# Patient Record
Sex: FEMALE | Race: WHITE | Employment: OTHER | URBAN - METROPOLITAN AREA
[De-identification: names, ages, dates, MRNs, and addresses within clinical notes are randomized per-mention and may not be internally consistent; named-entity substitution may affect disease eponyms.]

---

## 2017-01-03 ENCOUNTER — TRANSFERRED RECORDS (OUTPATIENT)
Dept: HEALTH INFORMATION MANAGEMENT | Facility: CLINIC | Age: 77
End: 2017-01-03

## 2017-01-03 DIAGNOSIS — R41.89 COGNITIVE IMPAIRMENT: Primary | ICD-10-CM

## 2017-01-03 DIAGNOSIS — R41.3 MEMORY LOSS: ICD-10-CM

## 2017-01-04 RX ORDER — DONEPEZIL HYDROCHLORIDE 10 MG/1
TABLET, FILM COATED ORAL
Qty: 90 TABLET | Refills: 2 | Status: SHIPPED | OUTPATIENT
Start: 2017-01-04 | End: 2017-11-14

## 2017-01-04 NOTE — TELEPHONE ENCOUNTER
Received refill request for patients aricept. Plan at visit 9/2016 was to continue this med. Refill sent.

## 2017-06-26 ENCOUNTER — TRANSFERRED RECORDS (OUTPATIENT)
Dept: HEALTH INFORMATION MANAGEMENT | Facility: CLINIC | Age: 77
End: 2017-06-26

## 2017-07-10 ENCOUNTER — TRANSFERRED RECORDS (OUTPATIENT)
Dept: HEALTH INFORMATION MANAGEMENT | Facility: CLINIC | Age: 77
End: 2017-07-10

## 2017-07-12 ENCOUNTER — TRANSFERRED RECORDS (OUTPATIENT)
Dept: HEALTH INFORMATION MANAGEMENT | Facility: CLINIC | Age: 77
End: 2017-07-12

## 2017-07-17 ENCOUNTER — TRANSFERRED RECORDS (OUTPATIENT)
Dept: HEALTH INFORMATION MANAGEMENT | Facility: CLINIC | Age: 77
End: 2017-07-17

## 2017-09-11 NOTE — TELEPHONE ENCOUNTER
Received refill request for Donepezil.  Last in clinic 11/2016 for pre-op appt. No annual exam in 2016. Annual exam needed, did not refill pending notification to patient.    Attempted to reach pt via phone but no answer and no voicemail available.

## 2017-09-12 NOTE — TELEPHONE ENCOUNTER
Received refill request for Donepezil.  Last in clinic 11/2016 for pre-op appt. No annual exam in 2016. Annual exam needed, did not refill pending notification to patient.    Second attempt this week to reach pt via phone but no answer and no voicemail available.

## 2017-09-19 NOTE — TELEPHONE ENCOUNTER
Received call from [address], stating he works with Pol[facility] and they received a call yesterday and need to schedule an appointment.  Forwarded him to  to schedule.      NOTE: He stated they are receiving ALL medications from [place] now as they moved there. Confirmed they DO NOT need a refill of medication from Dr. Lee and this was confirmed.

## 2017-10-11 ENCOUNTER — RADIANT APPOINTMENT (OUTPATIENT)
Dept: MAMMOGRAPHY | Facility: CLINIC | Age: 77
End: 2017-10-11
Attending: FAMILY MEDICINE
Payer: MEDICARE

## 2017-10-11 ENCOUNTER — OFFICE VISIT (OUTPATIENT)
Dept: FAMILY MEDICINE | Facility: CLINIC | Age: 77
End: 2017-10-11
Attending: FAMILY MEDICINE
Payer: MEDICARE

## 2017-10-11 VITALS
WEIGHT: 175.1 LBS | DIASTOLIC BLOOD PRESSURE: 75 MMHG | SYSTOLIC BLOOD PRESSURE: 121 MMHG | HEIGHT: 62 IN | BODY MASS INDEX: 32.22 KG/M2 | HEART RATE: 53 BPM

## 2017-10-11 DIAGNOSIS — R41.3 MEMORY LOSS: ICD-10-CM

## 2017-10-11 DIAGNOSIS — E55.9 VITAMIN D DEFICIENCY: ICD-10-CM

## 2017-10-11 DIAGNOSIS — Z13.220 SCREENING FOR CHOLESTEROL LEVEL: ICD-10-CM

## 2017-10-11 DIAGNOSIS — Z13.1 SCREENING FOR DIABETES MELLITUS: ICD-10-CM

## 2017-10-11 DIAGNOSIS — J45.30 MILD PERSISTENT ASTHMA WITHOUT COMPLICATION: ICD-10-CM

## 2017-10-11 DIAGNOSIS — Z00.00 ANNUAL PHYSICAL EXAM: Primary | ICD-10-CM

## 2017-10-11 DIAGNOSIS — E03.9 ACQUIRED HYPOTHYROIDISM: ICD-10-CM

## 2017-10-11 DIAGNOSIS — Z12.31 VISIT FOR SCREENING MAMMOGRAM: ICD-10-CM

## 2017-10-11 PROBLEM — J45.909 ASTHMA: Status: ACTIVE | Noted: 2017-10-11

## 2017-10-11 PROBLEM — Z88.9 MULTIPLE ALLERGIES: Status: ACTIVE | Noted: 2017-10-11

## 2017-10-11 PROBLEM — K21.9 GERD (GASTROESOPHAGEAL REFLUX DISEASE): Status: ACTIVE | Noted: 2017-10-11

## 2017-10-11 PROBLEM — E78.00 HIGH CHOLESTEROL: Status: ACTIVE | Noted: 2017-10-11

## 2017-10-11 LAB
ANION GAP SERPL CALCULATED.3IONS-SCNC: 8 MMOL/L (ref 3–14)
BUN SERPL-MCNC: 20 MG/DL (ref 7–30)
CALCIUM SERPL-MCNC: 8.7 MG/DL (ref 8.5–10.1)
CHLORIDE SERPL-SCNC: 109 MMOL/L (ref 94–109)
CHOLEST SERPL-MCNC: 226 MG/DL
CO2 SERPL-SCNC: 26 MMOL/L (ref 20–32)
CREAT SERPL-MCNC: 0.95 MG/DL (ref 0.52–1.04)
DEPRECATED CALCIDIOL+CALCIFEROL SERPL-MC: 43 UG/L (ref 20–75)
ERYTHROCYTE [DISTWIDTH] IN BLOOD BY AUTOMATED COUNT: 13.6 % (ref 10–15)
GFR SERPL CREATININE-BSD FRML MDRD: 57 ML/MIN/1.7M2
GLUCOSE SERPL-MCNC: 88 MG/DL (ref 70–99)
HCT VFR BLD AUTO: 40.7 % (ref 35–47)
HDLC SERPL-MCNC: 75 MG/DL
HGB BLD-MCNC: 13.5 G/DL (ref 11.7–15.7)
LDLC SERPL CALC-MCNC: 133 MG/DL
MCH RBC QN AUTO: 30.7 PG (ref 26.5–33)
MCHC RBC AUTO-ENTMCNC: 33.2 G/DL (ref 31.5–36.5)
MCV RBC AUTO: 93 FL (ref 78–100)
NONHDLC SERPL-MCNC: 151 MG/DL
PLATELET # BLD AUTO: 204 10E9/L (ref 150–450)
POTASSIUM SERPL-SCNC: 4.4 MMOL/L (ref 3.4–5.3)
RBC # BLD AUTO: 4.4 10E12/L (ref 3.8–5.2)
SODIUM SERPL-SCNC: 143 MMOL/L (ref 133–144)
TRIGL SERPL-MCNC: 91 MG/DL
TSH SERPL DL<=0.005 MIU/L-ACNC: 2.5 MU/L (ref 0.4–4)
WBC # BLD AUTO: 4.4 10E9/L (ref 4–11)

## 2017-10-11 PROCEDURE — 36415 COLL VENOUS BLD VENIPUNCTURE: CPT | Performed by: FAMILY MEDICINE

## 2017-10-11 PROCEDURE — 82306 VITAMIN D 25 HYDROXY: CPT | Performed by: FAMILY MEDICINE

## 2017-10-11 PROCEDURE — 80048 BASIC METABOLIC PNL TOTAL CA: CPT | Performed by: FAMILY MEDICINE

## 2017-10-11 PROCEDURE — 84443 ASSAY THYROID STIM HORMONE: CPT | Performed by: FAMILY MEDICINE

## 2017-10-11 PROCEDURE — 80061 LIPID PANEL: CPT | Performed by: FAMILY MEDICINE

## 2017-10-11 PROCEDURE — G0202 SCR MAMMO BI INCL CAD: HCPCS

## 2017-10-11 PROCEDURE — 85027 COMPLETE CBC AUTOMATED: CPT | Performed by: FAMILY MEDICINE

## 2017-10-11 PROCEDURE — 99213 OFFICE O/P EST LOW 20 MIN: CPT | Mod: 25,ZF

## 2017-10-11 ASSESSMENT — PAIN SCALES - GENERAL: PAINLEVEL: NO PAIN (0)

## 2017-10-11 NOTE — MR AVS SNAPSHOT
After Visit Summary   10/11/2017    Polly Gonzalez    MRN: [id number]           Patient Information     Date Of Birth          1940        Visit Information        Provider Department      10/11/2017 8:40 AM Ghazal Lee MD [facility]        Today's Diagnoses     Annual physical exam    -  1    Acquired hypothyroidism        Mild persistent asthma without complication        Memory loss        Screening for cholesterol level        Vitamin D deficiency        Screening for diabetes mellitus           Follow-ups after your visit        Who to contact     Please call your clinic at [phone] to:    Ask questions about your health    Make or cancel appointments    Discuss your medicines    Learn about your test results    Speak to your doctor   If you have compliments or concerns about an experience at your clinic, or if you wish to file a complaint, please contact [facility] Patient Relations at [phone] or email us at [email or web address]         Additional Information About Your Visit        MyChart Information     [employer] gives you secure access to your electronic health record. If you see a primary care provider, you can also send messages to your care team and make appointments. If you have questions, please call your primary care clinic.  If you do not have a primary care provider, please call [phone] and they will assist you.      Apture is an electronic gateway that provides easy, online access to your medical records. With Apture, you can request a clinic appointment, read your test results, renew a prescription or communicate with your care team.     To access your existing account, please contact your [facility] or call [phone] for assistance.        Care EveryWhere ID     This is your Care EveryWhere ID. This could be used by other organizations to access your [place]  "medical records  [id number]        Your Vitals Were     Pulse Height BMI (Body Mass Index)             53 1.575 m (5' 2\") 32.03 kg/m2          Blood Pressure from Last 3 Encounters:   10/11/17 121/75   11/08/16 120/75   09/06/16 106/64    Weight from Last 3 Encounters:   10/11/17 79.4 kg (175 lb 1.6 oz)   11/08/16 74.4 kg (164 lb)   09/06/16 70.9 kg (156 lb 6.4 oz)                 Today's Medication Changes          These changes are accurate as of: 10/11/17 11:03 AM.  If you have any questions, ask your nurse or doctor.               Stop taking these medicines if you haven't already. Please contact your care team if you have questions.     cholecalciferol 5000 UNITS Tabs tablet   Commonly known as:  vitamin D3   Stopped by:  Ghazal Lee MD                    Primary Care Provider Office Phone # Fax #    Ghazal Lee [phone]       [address] [place] [address]        Equal Access to Services     [facility]: Hadii steven ceballos Sojustino, waaxda lucarlos, qaybta kaalmapablo ferraro, kishore zuleta . So [facility] [phone].    ATENCIÓN: Si habla español, tiene a cat disposición servicios gratuitos de asistencia lingüística. Kwasi al [phone].    We comply with applicable federal civil rights laws and [place] laws. We do not discriminate on the basis of race, color, national origin, age, disability, sex, sexual orientation, or gender identity.            Thank you!     Thank you for choosing [facility]  for your care. Our goal is always to provide you with excellent care. Hearing back from our patients is one way we can continue to improve our services. Please take a few minutes to complete the written survey that you may receive in the mail after your visit with us. Thank you!             Your Updated Medication List - Protect others around you: Learn how to safely use, store and throw away your medicines at " www.disposemymeds.org.          This list is accurate as of: 10/11/17 11:03 AM.  Always use your most recent med list.                   Brand Name Dispense Instructions for use Diagnosis    * albuterol 108 (90 BASE) MCG/ACT Inhaler    PROAIR HFA/PROVENTIL HFA/VENTOLIN HFA    1 Inhaler    Inhale 2 puffs into the lungs every 6 hours as needed for shortness of breath / dyspnea or wheezing    Mild persistent asthma, uncomplicated       * albuterol (2.5 MG/3ML) 0.083% neb solution      Inhale 2.5 mg into the lungs        aspirin 81 MG tablet      Take 1 tablet by mouth daily.        CALCIUM-VITAMIN D PO      Vitamin B complex        donepezil 10 MG tablet    ARICEPT    90 tablet    TAKE ONE TABLET BY MOUTH AT BEDTIME.    Memory loss       fluticasone-salmeterol 250-50 MCG/DOSE diskus inhaler    ADVAIR    3 Inhaler    Inhale 1 puff into the lungs 2 times daily    Mild persistent asthma, uncomplicated       levothyroxine 100 MCG tablet    SYNTHROID/LEVOTHROID    90 tablet    Take 1 tablet (100 mcg) by mouth daily    Other specified hypothyroidism       lidocaine 2 % topical gel    XYLOCAINE     Apply topically as needed for moderate pain        psyllium Packet    METAMUCIL/KONSYL     Take 1 packet by mouth daily        * Notice:  This list has 2 medication(s) that are the same as other medications prescribed for you. Read the directions carefully, and ask your doctor or other care provider to review them with you.

## 2017-10-11 NOTE — LETTER
10/11/2017       RE: Polly Gonzalez  [id number]  [facility] [address]     Dear Colleague,    Thank you for referring your patient, Polly Gonzalez, to the [facility] at [facility]. Please see a copy of my visit note below.    SUBJECTIVE:   Polly Gonzalez is a 77 year old female who presents for Preventive Visit.  Healthy Habits:    Do you get at least three servings of calcium containing foods daily (dairy, green leafy vegetables, etc.)? yes    Amount of exercise or daily activities, outside of work: nothing regular     Problems taking medications regularly Yes  - Az helps    Medication side effects: No    Have you had an eye exam in the past two years? yes    Do you see a dentist twice per year? yes    Do you have sleep apnea, excessive snoring or daytime drowsiness?no  Concerns:  Sold home in [place] and purchased a home [place] of [place] [[place] acres and a house [single floor]]. Angelika went through and took off the roof so now in repair. Plan to move in November 15, 2017.  Staying at different places [cheap hotel in [place], [place]].     Support - ADRC: Elder Benefit speicialist: [facility]: [phone].  - Dementia on Aricept: follows with neurologist. Is not alone in the house. [long term care insurance].  Looking for more support.   - Hypothyroidism on levothyroxine 100 mcg  - Neck pain persists: Did see Dr. Newton [pain clinic in [place]] and discussed surgery.  Declined at this time. Was seeing a physical therapist.  Past Medical History:   Diagnosis Date     Asthma      GERD (gastroesophageal reflux disease)      HX: breast cancer      Hyperlipidemia LDL goal < 130      Hypothyroidism      Mild dementia      Mild major depression (H)      Past Surgical History:   Procedure Laterality Date     BLEPHAROPLASTY  09     BUNIONECTOMY DANICA BILATERAL  1993     face life  09     HERNIA REPAIR, INCISIONAL  1998    ?   LLQ     MASTECTOMY, FLAP  TRAM PEDICLE, COMBINED  1992     ROTATOR CUFF REPAIR RT/LT  1990/2001     Social History   Substance Use Topics     Smoking status: Never Smoker     Smokeless tobacco: Never Used     Alcohol use 0.0 oz/week     0 Standard drinks or equivalent per week      Comment: rare     The patient does not drink >3 drinks per day nor >7 drinks per week.  Today's PHQ-2 Score:   PHQ-2 ( 1999 Pfizer) 9/6/2016 1/7/2016   Q1: Little interest or pleasure in doing things - -   Q2: Feeling down, depressed or hopeless - -   PHQ-2 Score - -   Q1: Little interest or pleasure in doing things Several days Several days   Q2: Feeling down, depressed or hopeless Several days Several days   PHQ-2 Score 2 2   Do you feel safe in your environment - Yes  Do you have a Health Care Directive?: No: Advance care planning reviewed with patient; information given to patient to review. POLST  Current providers sharing in care for this patient include: Patient Care Team:  Ghazal Lee MD as PCP - General  Fidelia Wharton  Care Manager  Keith Ferraro MD as MD (Internal Medicine)  Yanick Cat MD as Resident  HunterSabino abad MD as MD (Allergy & Immunology)  Alejandro Connolly MD as MD (Ophthalmology)    Hearing impairment: No    Ability to successfully perform activities of daily living: needs to be watched closely.  Fall risk:  Home safety: no throw rugs in the hallway. Az states he does not leave her alone.   The following health maintenance items are reviewed in Epic and correct as of today:Health Maintenance   Topic Date Due     DANIEL QUESTIONNAIRE 1 YEAR  07/25/1958     DEPRESSION ACTION PLAN Q1 YR  07/25/1957     ADVANCE DIRECTIVE PLANNING Q5 YRS  07/25/1994     FALL RISK ASSESSMENT  07/25/2004     PNEUMOCOCCAL (1 of 2 - PCV13) 07/25/2004     ASTHMA CONTROL TEST Q6 MOS  03/23/2015     ASTHMA ACTION PLAN Q1 YR  09/04/2015     PHQ-9 Q3 MONTHS  09/30/2015     INFLUENZA VACCINE (SYSTEM ASSIGNED)  09/01/2017     LIPID  "SCREEN Q5 YR FEMALE (SYSTEM ASSIGNED)  08/19/2019     TETANUS IMMUNIZATION (SYSTEM ASSIGNED)  03/16/2020     DEXA SCAN SCREENING (SYSTEM ASSIGNED)  Completed   ROS:  C: NEGATIVE for fever, chills, change in weight  E/M: NEGATIVE for ear, mouth and throat problems  R: NEGATIVE for significant cough or SOB  CV: NEGATIVE for chest pain, palpitations or peripheral edema  OBJECTIVE:   /75  Pulse 53  Ht 1.575 m (5' 2\")  Wt 79.4 kg (175 lb 1.6 oz)  BMI 32.03 kg/m2 Estimated body mass index is 30 kg/(m^2) as calculated from the following:    Height as of 11/8/16: 1.575 m (5' 2\").    Weight as of 11/8/16: 74.4 kg (164 lb).  EXAM:   Well dressed, well appearing woman in NAD  Word finding difficulty and slow in responses.  Ranjan does most of the speaking  Present with you partner, RANJAN.   Psychological: appropriate mood.Quiet.  Slow recall on speech  Ears, Nose and Throat: tympanic membranes clear, nose clear and free of lesions, throat clear, moist mucous membrames, neck supple with full range of motion.    Neck: No thyroidmegaly. No jugular venous distension, no carotid bruits.  Cardiovascular: regular rate and rhythm, normal S1 and S2, no murmurs, rubs or gallops, peripheral pulses full and symmetric   Gastrointestinal: positive bowel sounds, nontender, no hepatosplenomegaly, no masses. No guarding or rebound.  Musculoskeletal: full range of motion    Skin: no concerning lesions, no jaundice.  Neurological: normal gait, no tremor.   ASSESSMENT / PLAN:   Diagnoses and associated orders for this visit:  Annual physical exam      - Advised finding a MD in [place] or [place]       - Mammogram today      - Labs today     Acquired hypothyroidism      -  Synthroid 100 mg   Mild persistent asthma without complication      -  Advair and Albuterol  Memory loss       - Neurologist on November 14, [place]       - Continue with Aricept  End of Life Planning:  Patient currently has an advanced directive: POLST   Estimated body " "mass index is 30 kg/(m^2) as calculated from the following:    Height as of 11/8/16: 1.575 m (5' 2\").    Weight as of 11/8/16: 74.4 kg (164 lb).     reports that she has never smoked. She has never used smokeless tobacco.  Appropriate preventive services were discussed with this patient, including applicable screening as appropriate for cardiovascular disease, diabetes, osteopenia/osteoporosis, and glaucoma.  As appropriate for age/gender, discussed screening for colorectal cancer, prostate cancer, breast cancer, and cervical cancer. Checklist reviewing preventive services available has been given to the patient.  Reviewed patients plan of care and provided an AVS. The Basic Care Plan (routine screening as documented in Health Maintenance) for Polly meets the Care Plan requirement. This Care Plan has been established and reviewed with the Patient.  Counseling Resources:  ATP IV Guidelines  Pooled Cohorts Equation Calculator  Breast Cancer Risk Calculator  FRAX Risk Assessment  ICSI Preventive Guidelines  Dietary Guidelines for Americans, 2010  USDA's MyPlate  ASA Prophylaxis  Lung CA Screening      Again, thank you for allowing me to participate in the care of your patient.      Sincerely,    Ghazal Lee MD      "

## 2017-10-11 NOTE — PROGRESS NOTES
SUBJECTIVE:   Polly Gonzalez is a 77 year old female who presents for Preventive Visit.  Healthy Habits:    Do you get at least three servings of calcium containing foods daily (dairy, green leafy vegetables, etc.)? yes    Amount of exercise or daily activities, outside of work: nothing regular     Problems taking medications regularly Yes  - Az helps    Medication side effects: No    Have you had an eye exam in the past two years? yes    Do you see a dentist twice per year? yes    Do you have sleep apnea, excessive snoring or daytime drowsiness?no  Concerns:  Sold home in [place] and purchased a home [place] of [place] [31 acres and a house [single floor]]. Angelika went through and took off the roof so now in repair. Plan to move in November 15, 2017.  Staying at different places [[employer] hotel in [place], [place]].     Support - ADRC: Elder Benefit speicialist: Anay Tiffaniemauricio: [phone].  - Dementia on Aricept: follows with neurologist. Is not alone in the house. [long term care insurance].  Looking for more support.   - Hypothyroidism on levothyroxine 100 mcg  - Neck pain persists: Did see Dr. Newton [pain clinic in [place]] and discussed surgery.  Declined at this time. Was seeing a physical therapist.  Past Medical History:   Diagnosis Date     Asthma      GERD (gastroesophageal reflux disease)      HX: breast cancer      Hyperlipidemia LDL goal < 130      Hypothyroidism      Mild dementia      Mild major depression (H)      Past Surgical History:   Procedure Laterality Date     BLEPHAROPLASTY  09     BUNIONECTOMY DANICA BILATERAL  1993     face life  09     HERNIA REPAIR, INCISIONAL  1998    ?   LLQ     MASTECTOMY, FLAP TRAM PEDICLE, COMBINED  1992     ROTATOR CUFF REPAIR RT/LT  1990/2001     Social History   Substance Use Topics     Smoking status: Never Smoker     Smokeless tobacco: Never Used     Alcohol use 0.0 oz/week     0 Standard drinks or equivalent per week      Comment: rare     The patient does  not drink >3 drinks per day nor >7 drinks per week.  Today's PHQ-2 Score:   PHQ-2 ( 1999 Pfizer) 9/6/2016 1/7/2016   Q1: Little interest or pleasure in doing things - -   Q2: Feeling down, depressed or hopeless - -   PHQ-2 Score - -   Q1: Little interest or pleasure in doing things Several days Several days   Q2: Feeling down, depressed or hopeless Several days Several days   PHQ-2 Score 2 2   Do you feel safe in your environment - Yes  Do you have a Health Care Directive?: No: Advance care planning reviewed with patient; information given to patient to review. POLST  Current providers sharing in care for this patient include: Patient Care Team:  Ghazal Lee MD as PCP - General  Fidelia Wharton  Care Manager  Keith Ferraro MD as MD (Internal Medicine)  Yanick Cat MD as Resident  Sabino Hunter MD as MD (Allergy & Immunology)  Alejandro Connolly MD as MD (Ophthalmology)    Hearing impairment: No    Ability to successfully perform activities of daily living: needs to be watched closely.  Fall risk:  Home safety: no throw rugs in the hallway. Az states he does not leave her alone.   The following health maintenance items are reviewed in Epic and correct as of today:Health Maintenance   Topic Date Due     DANIEL QUESTIONNAIRE 1 YEAR  07/25/1958     DEPRESSION ACTION PLAN Q1 YR  07/25/1957     ADVANCE DIRECTIVE PLANNING Q5 YRS  07/25/1994     FALL RISK ASSESSMENT  07/25/2004     PNEUMOCOCCAL (1 of 2 - PCV13) 07/25/2004     ASTHMA CONTROL TEST Q6 MOS  03/23/2015     ASTHMA ACTION PLAN Q1 YR  09/04/2015     PHQ-9 Q3 MONTHS  09/30/2015     INFLUENZA VACCINE (SYSTEM ASSIGNED)  09/01/2017     LIPID SCREEN Q5 YR FEMALE (SYSTEM ASSIGNED)  08/19/2019     TETANUS IMMUNIZATION (SYSTEM ASSIGNED)  03/16/2020     DEXA SCAN SCREENING (SYSTEM ASSIGNED)  Completed   ROS:  C: NEGATIVE for fever, chills, change in weight  E/M: NEGATIVE for ear, mouth and throat problems  R: NEGATIVE for significant  "cough or SOB  CV: NEGATIVE for chest pain, palpitations or peripheral edema  OBJECTIVE:   /75  Pulse 53  Ht 1.575 m (5' 2\")  Wt 79.4 kg (175 lb 1.6 oz)  BMI 32.03 kg/m2 Estimated body mass index is 30 kg/(m^2) as calculated from the following:    Height as of 11/8/16: 1.575 m (5' 2\").    Weight as of 11/8/16: 74.4 kg (164 lb).  EXAM:   Well dressed, well appearing woman in NAD  Word finding difficulty and slow in responses.  Ranjan does most of the speaking  Present with you partner, RANJAN.   Psychological: appropriate mood.Quiet.  Slow recall on speech  Ears, Nose and Throat: tympanic membranes clear, nose clear and free of lesions, throat clear, moist mucous membrames, neck supple with full range of motion.    Neck: No thyroidmegaly. No jugular venous distension, no carotid bruits.  Cardiovascular: regular rate and rhythm, normal S1 and S2, no murmurs, rubs or gallops, peripheral pulses full and symmetric   Gastrointestinal: positive bowel sounds, nontender, no hepatosplenomegaly, no masses. No guarding or rebound.  Musculoskeletal: full range of motion    Skin: no concerning lesions, no jaundice.  Neurological: normal gait, no tremor.   ASSESSMENT / PLAN:   Diagnoses and associated orders for this visit:  Annual physical exam      - Advised finding a MD in [place] or [place]       - Mammogram today      - Labs today     Acquired hypothyroidism      -  Synthroid 100 mg   Mild persistent asthma without complication      -  Advair and Albuterol  Memory loss       - Neurologist on November 14, [place]       - Continue with Aricept  End of Life Planning:  Patient currently has an advanced directive: POLST   Estimated body mass index is 30 kg/(m^2) as calculated from the following:    Height as of 11/8/16: 1.575 m (5' 2\").    Weight as of 11/8/16: 74.4 kg (164 lb).     reports that she has never smoked. She has never used smokeless tobacco.  Appropriate preventive services were discussed with this patient, " including applicable screening as appropriate for cardiovascular disease, diabetes, osteopenia/osteoporosis, and glaucoma.  As appropriate for age/gender, discussed screening for colorectal cancer, prostate cancer, breast cancer, and cervical cancer. Checklist reviewing preventive services available has been given to the patient.  Reviewed patients plan of care and provided an AVS. The Basic Care Plan (routine screening as documented in Health Maintenance) for Polly meets the Care Plan requirement. This Care Plan has been established and reviewed with the Patient.  Counseling Resources:  ATP IV Guidelines  Pooled Cohorts Equation Calculator  Breast Cancer Risk Calculator  FRAX Risk Assessment  ICSI Preventive Guidelines  Dietary Guidelines for Americans, 2010  USDA's MyPlate  ASA Prophylaxis  Lung CA Screening    Ghazal Lee MD  [facility]

## 2017-11-14 DIAGNOSIS — R41.3 MEMORY LOSS: ICD-10-CM

## 2017-11-14 RX ORDER — DONEPEZIL HYDROCHLORIDE 10 MG/1
TABLET, FILM COATED ORAL
Qty: 90 TABLET | Refills: 3 | Status: SHIPPED | OUTPATIENT
Start: 2017-11-14 | End: 2018-11-26

## 2017-11-14 NOTE — TELEPHONE ENCOUNTER
Received refill request for donepezil .  Last in clinic 10/2017 where this medication was to be continued.  Will send 12 mo supply.

## 2017-12-05 DIAGNOSIS — J45.30 MILD PERSISTENT ASTHMA, UNCOMPLICATED: ICD-10-CM

## 2017-12-05 DIAGNOSIS — E03.8 OTHER SPECIFIED HYPOTHYROIDISM: ICD-10-CM

## 2017-12-06 RX ORDER — LEVOTHYROXINE SODIUM 100 UG/1
TABLET ORAL
Qty: 90 TABLET | Refills: 3 | Status: SHIPPED | OUTPATIENT
Start: 2017-12-06 | End: 2019-03-05

## 2018-01-10 ENCOUNTER — TRANSFERRED RECORDS (OUTPATIENT)
Dept: HEALTH INFORMATION MANAGEMENT | Facility: CLINIC | Age: 78
End: 2018-01-10

## 2018-11-08 ENCOUNTER — RADIANT APPOINTMENT (OUTPATIENT)
Dept: MAMMOGRAPHY | Facility: CLINIC | Age: 78
End: 2018-11-08
Attending: FAMILY MEDICINE
Payer: MEDICARE

## 2018-11-08 DIAGNOSIS — Z12.31 VISIT FOR SCREENING MAMMOGRAM: ICD-10-CM

## 2018-11-08 PROCEDURE — 77067 SCR MAMMO BI INCL CAD: CPT

## 2018-11-26 DIAGNOSIS — R41.3 MEMORY LOSS: ICD-10-CM

## 2018-11-30 RX ORDER — DONEPEZIL HYDROCHLORIDE 10 MG/1
TABLET, FILM COATED ORAL
Qty: 30 TABLET | Refills: 0 | Status: SHIPPED | OUTPATIENT
Start: 2018-11-30 | End: 2019-03-29

## 2018-11-30 NOTE — TELEPHONE ENCOUNTER
Received refill request for Aricept.  Last in clinic October 2017.     Patient scheduled for annual December 2018. Short-term supply sent to pharmacy.

## 2018-12-05 ENCOUNTER — OFFICE VISIT (OUTPATIENT)
Dept: FAMILY MEDICINE | Facility: CLINIC | Age: 78
End: 2018-12-05
Attending: FAMILY MEDICINE
Payer: MEDICARE

## 2018-12-05 VITALS
HEIGHT: 62 IN | DIASTOLIC BLOOD PRESSURE: 75 MMHG | WEIGHT: 176 LBS | SYSTOLIC BLOOD PRESSURE: 112 MMHG | HEART RATE: 65 BPM | BODY MASS INDEX: 32.39 KG/M2

## 2018-12-05 DIAGNOSIS — Z13.1 SCREENING FOR DIABETES MELLITUS: ICD-10-CM

## 2018-12-05 DIAGNOSIS — T14.8XXA SKIN EXCORIATION: ICD-10-CM

## 2018-12-05 DIAGNOSIS — Z13.220 SCREENING FOR CHOLESTEROL LEVEL: ICD-10-CM

## 2018-12-05 DIAGNOSIS — F03.91 DEMENTIA WITH BEHAVIORAL DISTURBANCE, UNSPECIFIED DEMENTIA TYPE: ICD-10-CM

## 2018-12-05 DIAGNOSIS — Z13.0 SCREENING FOR DEFICIENCY ANEMIA: ICD-10-CM

## 2018-12-05 DIAGNOSIS — Z00.00 ANNUAL PHYSICAL EXAM: Primary | ICD-10-CM

## 2018-12-05 DIAGNOSIS — F03.90 SENILE DEMENTIA WITHOUT BEHAVIORAL DISTURBANCE (H): ICD-10-CM

## 2018-12-05 DIAGNOSIS — E03.9 ACQUIRED HYPOTHYROIDISM: ICD-10-CM

## 2018-12-05 LAB
ANION GAP SERPL CALCULATED.3IONS-SCNC: 6 MMOL/L (ref 3–14)
BUN SERPL-MCNC: 29 MG/DL (ref 7–30)
CALCIUM SERPL-MCNC: 8.9 MG/DL (ref 8.5–10.1)
CHLORIDE SERPL-SCNC: 106 MMOL/L (ref 94–109)
CHOLEST SERPL-MCNC: 244 MG/DL
CO2 SERPL-SCNC: 29 MMOL/L (ref 20–32)
CREAT SERPL-MCNC: 0.92 MG/DL (ref 0.52–1.04)
ERYTHROCYTE [DISTWIDTH] IN BLOOD BY AUTOMATED COUNT: 13.5 % (ref 10–15)
GFR SERPL CREATININE-BSD FRML MDRD: 59 ML/MIN/1.7M2
GLUCOSE SERPL-MCNC: 93 MG/DL (ref 70–99)
HCT VFR BLD AUTO: 42.6 % (ref 35–47)
HDLC SERPL-MCNC: 54 MG/DL
HGB BLD-MCNC: 13.6 G/DL (ref 11.7–15.7)
LDLC SERPL CALC-MCNC: 142 MG/DL
MCH RBC QN AUTO: 30.4 PG (ref 26.5–33)
MCHC RBC AUTO-ENTMCNC: 31.9 G/DL (ref 31.5–36.5)
MCV RBC AUTO: 95 FL (ref 78–100)
NONHDLC SERPL-MCNC: 190 MG/DL
PLATELET # BLD AUTO: 222 10E9/L (ref 150–450)
POTASSIUM SERPL-SCNC: 4.5 MMOL/L (ref 3.4–5.3)
RBC # BLD AUTO: 4.48 10E12/L (ref 3.8–5.2)
SODIUM SERPL-SCNC: 141 MMOL/L (ref 133–144)
TRIGL SERPL-MCNC: 240 MG/DL
TSH SERPL DL<=0.005 MIU/L-ACNC: 2 MU/L (ref 0.4–4)
WBC # BLD AUTO: 4.7 10E9/L (ref 4–11)

## 2018-12-05 PROCEDURE — 36415 COLL VENOUS BLD VENIPUNCTURE: CPT | Performed by: FAMILY MEDICINE

## 2018-12-05 PROCEDURE — 80048 BASIC METABOLIC PNL TOTAL CA: CPT | Performed by: FAMILY MEDICINE

## 2018-12-05 PROCEDURE — G0463 HOSPITAL OUTPT CLINIC VISIT: HCPCS | Mod: ZF

## 2018-12-05 PROCEDURE — 85027 COMPLETE CBC AUTOMATED: CPT | Performed by: FAMILY MEDICINE

## 2018-12-05 PROCEDURE — 84443 ASSAY THYROID STIM HORMONE: CPT | Performed by: FAMILY MEDICINE

## 2018-12-05 PROCEDURE — 80061 LIPID PANEL: CPT | Performed by: FAMILY MEDICINE

## 2018-12-05 RX ORDER — IBUPROFEN 200 MG
200 TABLET ORAL EVERY 4 HOURS PRN
COMMUNITY
End: 2021-05-26

## 2018-12-05 RX ORDER — MUPIROCIN 20 MG/G
OINTMENT TOPICAL 3 TIMES DAILY
Qty: 22 G | Refills: 1 | Status: SHIPPED | OUTPATIENT
Start: 2018-12-05 | End: 2021-05-26

## 2018-12-05 ASSESSMENT — PAIN SCALES - GENERAL: PAINLEVEL: NO PAIN (0)

## 2018-12-05 ASSESSMENT — ANXIETY QUESTIONNAIRES
3. WORRYING TOO MUCH ABOUT DIFFERENT THINGS: NOT AT ALL
2. NOT BEING ABLE TO STOP OR CONTROL WORRYING: NOT AT ALL
GAD7 TOTAL SCORE: 0
6. BECOMING EASILY ANNOYED OR IRRITABLE: NOT AT ALL
5. BEING SO RESTLESS THAT IT IS HARD TO SIT STILL: NOT AT ALL
7. FEELING AFRAID AS IF SOMETHING AWFUL MIGHT HAPPEN: NOT AT ALL
1. FEELING NERVOUS, ANXIOUS, OR ON EDGE: NOT AT ALL

## 2018-12-05 ASSESSMENT — PATIENT HEALTH QUESTIONNAIRE - PHQ9
5. POOR APPETITE OR OVEREATING: NOT AT ALL
SUM OF ALL RESPONSES TO PHQ QUESTIONS 1-9: 3

## 2018-12-05 NOTE — MR AVS SNAPSHOT
After Visit Summary   12/5/2018    Polly Gonzalez    MRN: [id number]           Patient Information     Date Of Birth          1940        Visit Information        Provider Department      12/5/2018 11:20 AM Ghazal Lee MD [facility]        Today's Diagnoses     Annual physical exam    -  1    Acquired hypothyroidism        Screening for diabetes mellitus        Screening for deficiency anemia        Screening for cholesterol level        Dementia with behavioral disturbance, unspecified dementia type        Skin excoriation        Senile dementia without behavioral disturbance          Care Instructions    [facility] [place] [phone]           Follow-ups after your visit        Additional Services     NEUROLOGY ADULT REFERRAL       Your provider has referred you for the following:   Consult at [facility]: Neurology [facility] - [place] [phone]   [email or web address]      Please be aware that coverage of these services is subject to the terms and limitations of your health insurance plan.  Call member services at your health plan with any benefit or coverage questions.      Please bring the following with you to your appointment:    (1) Any X-Rays, CTs or MRIs which have been performed.  Contact the facility where they were done to arrange for  prior to your scheduled appointment.    (2) List of current medications  (3) This referral request   (4) Any documents/labs given to you for this referral                  Who to contact     Please call your clinic at [phone] to:    Ask questions about your health    Make or cancel appointments    Discuss your medicines    Learn about your test results    Speak to your doctor            Additional Information About Your Visit        MyChart Information     [employer] gives you secure access to your electronic health record. If you see a primary care provider, you can also  "send messages to your care team and make appointments. If you have questions, please call your primary care clinic.  If you do not have a primary care provider, please call [phone] and they will assist you.      [employer] is an electronic gateway that provides easy, online access to your medical records. With [employer], you can request a clinic appointment, read your test results, renew a prescription or communicate with your care team.     To access your existing account, please contact your [facility] or call [phone] for assistance.        Care EveryWhere ID     This is your Care EveryWhere ID. This could be used by other organizations to access your [place] medical records  [id number]        Your Vitals Were     Pulse Height BMI (Body Mass Index)             65 1.575 m (5' 2\") 32.19 kg/m2          Blood Pressure from Last 3 Encounters:   12/05/18 112/75   10/11/17 121/75   11/08/16 120/75    Weight from Last 3 Encounters:   12/05/18 79.8 kg (176 lb)   10/11/17 79.4 kg (175 lb 1.6 oz)   11/08/16 74.4 kg (164 lb)              We Performed the Following     Basic Metabolic Panel     CBC with Platelets     Lipid Profile     NEUROLOGY ADULT REFERRAL     TSH          Today's Medication Changes          These changes are accurate as of 12/5/18 12:24 PM.  If you have any questions, ask your nurse or doctor.               Start taking these medicines.        Dose/Directions    mupirocin 2 % external ointment   Commonly known as:  BACTROBAN   Used for:  Skin excoriation   Started by:  Ghazal Lee MD        Apply topically 3 times daily   Quantity:  22 g   Refills:  1            Where to get your medicines      These medications were sent to [facility] - [place], [place] - [address]  [address] [facility] [address]     Phone:  [phone]     mupirocin 2 % external ointment                Primary Care Provider Office Phone # Fax #    Ghazal Lee, " [phone]       [address]  [facility] [address]        Equal Access to Services     LAURA AG : Hadii steven granados lin Goode, wamitchda gavino, qakeniata kajuanda jasmine, kishore jo laLakianagi jose antonio. So [facility] [phone].    ATENCIÓN: Si habla español, tiene a cat disposición servicios gratuitos de asistencia lingüística. Kwasi al [phone].    We comply with applicable federal civil rights laws and [place] laws. We do not discriminate on the basis of race, color, national origin, age, disability, sex, sexual orientation, or gender identity.            Thank you!     Thank you for choosing [facility]  for your care. Our goal is always to provide you with excellent care. Hearing back from our patients is one way we can continue to improve our services. Please take a few minutes to complete the written survey that you may receive in the mail after your visit with us. Thank you!             Your Updated Medication List - Protect others around you: Learn how to safely use, store and throw away your medicines at www.disposemymeds.org.          This list is accurate as of 12/5/18 12:24 PM.  Always use your most recent med list.                   Brand Name Dispense Instructions for use Diagnosis    ADVAIR DISKUS 250-50 MCG/DOSE inhaler   Generic drug:  fluticasone-salmeterol     3 Inhaler    1 PUFF 2 TIMES A DAY    Mild persistent asthma, uncomplicated       * albuterol 108 (90 Base) MCG/ACT inhaler    PROAIR HFA/PROVENTIL HFA/VENTOLIN HFA    1 Inhaler    Inhale 2 puffs into the lungs every 6 hours as needed for shortness of breath / dyspnea or wheezing    Mild persistent asthma, uncomplicated       * albuterol (2.5 MG/3ML) 0.083% neb solution    PROVENTIL     Inhale 2.5 mg into the lungs        aspirin 81 MG tablet    ASA     Take 1 tablet by mouth daily.        donepezil 10 MG tablet    ARICEPT    30 tablet    TAKE ONE TABLET BY MOUTH AT BEDTIME     Memory loss       ibuprofen 200 MG tablet    ADVIL/MOTRIN     Take 200 mg by mouth every 4 hours as needed for mild pain        levothyroxine 100 MCG tablet    SYNTHROID/LEVOTHROID    90 tablet    TAKE ONE TABLET BY MOUTH ONCE DAILY    Other specified hypothyroidism       lidocaine 2 % external gel    XYLOCAINE     Apply topically as needed for moderate pain        MULTIVITAMIN ADULT PO           mupirocin 2 % external ointment    BACTROBAN    22 g    Apply topically 3 times daily    Skin excoriation       psyllium Packet    METAMUCIL/KONSYL     Take 1 packet by mouth daily        * Notice:  This list has 2 medication(s) that are the same as other medications prescribed for you. Read the directions carefully, and ask your doctor or other care provider to review them with you.

## 2018-12-05 NOTE — NURSING NOTE
Chief Complaint   Patient presents with     Annual Visit     [facility]

## 2018-12-05 NOTE — LETTER
12/5/2018       RE: Polly Gonzalez  [id number] [facility] [address]     Dear Colleague,    Thank you for referring your patient, Polly Gonzalez, to the [facility] at [facility]. Please see a copy of my visit note below.    SUBJECTIVE:   Polly Gonzalez is a 78 year old female who presents for Preventive Visit:   Healthy Habits:    Do you get at least three servings of calcium containing foods daily (dairy, green leafy vegetables, etc.)? yes    Amount of exercise or daily activities:: nothing regular - very stationary     Problems taking medications regularly Yes  - Az helps    Medication side effects: No    Have you had an eye exam in the past two years? yes    Do you see a dentist twice per year? yes    Do you have sleep apnea, excessive snoring or daytime drowsiness? no  Concerns:    Sold home in [place] and purchased a [place] [[place] acres and a single floor house]    Dementia on Aricept: follows with neurologist. [long term care insurance]. Can do her own ADL's. Verbal communication is less.     Doing more picking of skin/face. She has a number of facial excoriations.    Getting support in the home: ADRC: Elder Benefit speicialist: Anay [facility]: [phone].  Getting a couple hours a week    Az is her primary support. She says he will do everything to keep her from going to a nursing home    They have not identified a physician closer to their home in [place]   - Hypothyroidism on levothyroxine 100 mcg  - Neck pain persists- uses neck brace in car.   Past Medical History:   Diagnosis Date     Asthma      GERD (gastroesophageal reflux disease)      HX: breast cancer      Hyperlipidemia LDL goal < 130      Hypothyroidism      Mild dementia      Mild major depression (H)      Past Surgical History:   Procedure Laterality Date     BLEPHAROPLASTY  09     BUNIONECTOMY DANICA BILATERAL  1993     face life  09     HERNIA REPAIR,  INCISIONAL  1998    ?   LLQ     MASTECTOMY, FLAP TRAM PEDICLE, COMBINED  1992     ROTATOR CUFF REPAIR RT/LT  1990/2001     Social History   Substance Use Topics     Smoking status: Never Smoker     Smokeless tobacco: Never Used     Alcohol use 0.0 oz/week     0 Standard drinks or equivalent per week      Comment: rare     The patient does not drink >3 drinks per day nor >7 drinks per week.  Today's PHQ-2 Score:   PHQ-2 ( 1999 Pfizer) 9/6/2016 1/7/2016   Q1: Little interest or pleasure in doing things - -   Q2: Feeling down, depressed or hopeless - -   PHQ-2 Score - -   Q1: Little interest or pleasure in doing things Several days Several days   Q2: Feeling down, depressed or hopeless Several days Several days   PHQ-2 Score 2 2   Do you feel safe in your environment - Yes  Do you have a Health Care Directive?: Advance care planning reviewed with patient; information given to patient to review.   Current providers sharing in care for this patient include: Patient Care Team:  Ghazal Lee MD as PCP - General  Fidelia Wharton  Care Manager  Keith Ferraro MD as MD (Internal Medicine)  Yanick Cat MD as Resident  Sabino Hunter MD as MD (Allergy & Immunology)  Alejandro Connolly MD as MD (Ophthalmology)    Hearing impairment: No    Ability to successfully perform activities of daily living: needs to be watched closely.  Fall risk:  Home safety: no throw rugs in the hallway. Az states he does not leave her alone.   The following health maintenance items are reviewed in Epic and correct as of today:  Health Maintenance   Topic Date Due     DANIEL QUESTIONNAIRE 1 YEAR  07/25/1958     DEPRESSION ACTION PLAN  07/25/1958     ADVANCE DIRECTIVE PLANNING Q5 YRS  07/25/1994     FALL RISK ASSESSMENT  07/25/2004     PNEUMOCOCCAL (1 of 2 - PCV13) 07/25/2004     ASTHMA CONTROL TEST Q6 MOS  03/23/2015     ASTHMA ACTION PLAN Q1 YR  09/04/2015     PHQ-9 Q3 MONTHS  09/30/2015     INFLUENZA VACCINE (1)  "09/01/2018     TETANUS IMMUNIZATION (SYSTEM ASSIGNED)  03/16/2020     LIPID SCREEN Q5 YR FEMALE (SYSTEM ASSIGNED)  10/11/2022     DEXA SCAN SCREENING (SYSTEM ASSIGNED)  Completed   ROS:  C: NEGATIVE for fever, chills, change in weight  E/M: NEGATIVE for ear, mouth and throat problems  R: NEGATIVE for significant cough or SOB  CV: NEGATIVE for chest pain, palpitations or peripheral edema  OBJECTIVE:   /75  Pulse 65  Ht 1.575 m (5' 2\")  Wt 79.8 kg (176 lb)  BMI 32.19 kg/m2 Estimated body mass index is 32.03 kg/(m^2) as calculated from the following:    Height as of 10/11/17: 1.575 m (5' 2\").    Weight as of 10/11/17: 79.4 kg (175 lb 1.6 oz).  EXAM:   Well dressed - presents with male partner -  RANJAN. Flat affect and sits quietly through the visit.   Word finding difficulty and slow in responses.  Limited interaction.Tearful when discussing the lack of Cat in the home [jorge l is allergic]  Psychological: appropriate mood.Quiet.  Slow recall on speech  Facial excoriations     Neck: No thyroidmegaly. .  Cardiovascular: regular rate and rhythm, normal S1 and S2, no murmurs, rubs or gallops, peripheral pulses full and symmetric   Gastrointestinal: positive bowel sounds, nontender, no hepatosplenomegaly, no masses. No guarding or rebound.  Musculoskeletal: full range of motion    Skin: facial excoriation from picking.   Neurological: normal gait, no tremor.   ASSESSMENT / PLAN:   Diagnoses and associated orders for this visit:  Annual physical exam      - [facility] [place] [facility]      - Mammogram up to date       - Labs today         - Encouraged advanced directive [wish is to stay out of the nursing home]  Skin excoriation  -     mupirocin (BACTROBAN) 2 % external ointment; Apply topically 3 times daily  Acquired hypothyroidism      -  Continue with Synthroid 100 mg      -  TSH labs today   Mild persistent asthma without complication      -  Advair and Albuterol - rarely needed without " "cats in the home   Memory loss/dementia with more decline over the past year        - Will see Neurologist at [facility] [January 15, 2019].        - Continue with Aricept       - Continue to look at county support and use of long term insurance  End of Life Planning:  Patient currently has an advanced directive: Az is her advocate  Estimated body mass index is 32.03 kg/(m^2) as calculated from the following:    Height as of 10/11/17: 1.575 m (5' 2\").    Weight as of 10/11/17: 79.4 kg (175 lb 1.6 oz).     reports that she has never smoked. She has never used smokeless tobacco.  Appropriate preventive services were discussed with this patient, including applicable screening as appropriate for cardiovascular disease, diabetes, osteopenia/osteoporosis, and glaucoma.  As appropriate for age/gender, discussed screening for colorectal cancer, prostate cancer, breast cancer, and cervical cancer. Checklist reviewing preventive services available has been given to the patient.  Reviewed patients plan of care and provided an AVS. The Basic Care Plan (routine screening as documented in Health Maintenance) for Polly meets the Care Plan requirement. This Care Plan has been established and reviewed with the Patient.  Counseling Resources:  ATP IV Guidelines  Pooled Cohorts Equation Calculator  Breast Cancer Risk Calculator  FRAX Risk Assessment  ICSI Preventive Guidelines  Dietary Guidelines for Americans, 2010  USDA's MyPlate  ASA Prophylaxis  Lung CA Screening    Ghazal Lee MD  [facility]    "

## 2018-12-07 ASSESSMENT — ANXIETY QUESTIONNAIRES: GAD7 TOTAL SCORE: 0

## 2018-12-13 ENCOUNTER — TRANSFERRED RECORDS (OUTPATIENT)
Dept: HEALTH INFORMATION MANAGEMENT | Facility: CLINIC | Age: 78
End: 2018-12-13

## 2019-03-05 DIAGNOSIS — E03.8 OTHER SPECIFIED HYPOTHYROIDISM: ICD-10-CM

## 2019-03-05 RX ORDER — LEVOTHYROXINE SODIUM 100 UG/1
TABLET ORAL
Qty: 90 TABLET | Refills: 3 | Status: SHIPPED | OUTPATIENT
Start: 2019-03-05 | End: 2020-03-14

## 2019-03-05 NOTE — TELEPHONE ENCOUNTER
Received refill request for levothyroxine.  Last in clinic 12/2018 and medication discussed with plan Acquired hypothyroidism      -  Continue with Synthroid 100 mg  TSH done with no changes.  Refill sent per protocol

## 2019-03-29 DIAGNOSIS — R41.3 MEMORY LOSS: ICD-10-CM

## 2019-04-01 RX ORDER — DONEPEZIL HYDROCHLORIDE 10 MG/1
TABLET, FILM COATED ORAL
Qty: 90 TABLET | Refills: 0 | Status: SHIPPED | OUTPATIENT
Start: 2019-04-01 | End: 2019-06-06

## 2019-06-06 ENCOUNTER — OFFICE VISIT (OUTPATIENT)
Dept: FAMILY MEDICINE | Facility: CLINIC | Age: 79
End: 2019-06-06
Attending: FAMILY MEDICINE
Payer: MEDICARE

## 2019-06-06 VITALS
HEIGHT: 62 IN | WEIGHT: 171.6 LBS | DIASTOLIC BLOOD PRESSURE: 74 MMHG | BODY MASS INDEX: 31.58 KG/M2 | HEART RATE: 66 BPM | SYSTOLIC BLOOD PRESSURE: 115 MMHG

## 2019-06-06 DIAGNOSIS — S00.81XS: ICD-10-CM

## 2019-06-06 DIAGNOSIS — R41.3 MEMORY LOSS: Primary | ICD-10-CM

## 2019-06-06 DIAGNOSIS — J45.30 MILD PERSISTENT ASTHMA, UNCOMPLICATED: ICD-10-CM

## 2019-06-06 PROCEDURE — G0463 HOSPITAL OUTPT CLINIC VISIT: HCPCS | Mod: ZF

## 2019-06-06 RX ORDER — DONEPEZIL HYDROCHLORIDE 10 MG/1
10 TABLET, FILM COATED ORAL DAILY
Qty: 90 TABLET | Refills: 3 | Status: SHIPPED | OUTPATIENT
Start: 2019-06-06 | End: 2021-05-26

## 2019-06-06 RX ORDER — ALBUTEROL SULFATE 90 UG/1
2 AEROSOL, METERED RESPIRATORY (INHALATION) EVERY 6 HOURS PRN
Qty: 1 INHALER | Refills: 1 | Status: SHIPPED | OUTPATIENT
Start: 2019-06-06 | End: 2021-05-26

## 2019-06-06 RX ORDER — ALBUTEROL SULFATE 90 UG/1
2 AEROSOL, METERED RESPIRATORY (INHALATION) EVERY 6 HOURS PRN
Qty: 1 INHALER | Refills: 1 | Status: SHIPPED | OUTPATIENT
Start: 2019-06-06 | End: 2019-06-06

## 2019-06-06 ASSESSMENT — ANXIETY QUESTIONNAIRES
1. FEELING NERVOUS, ANXIOUS, OR ON EDGE: SEVERAL DAYS
2. NOT BEING ABLE TO STOP OR CONTROL WORRYING: NOT AT ALL
3. WORRYING TOO MUCH ABOUT DIFFERENT THINGS: NOT AT ALL
6. BECOMING EASILY ANNOYED OR IRRITABLE: SEVERAL DAYS
GAD7 TOTAL SCORE: 2
7. FEELING AFRAID AS IF SOMETHING AWFUL MIGHT HAPPEN: NOT AT ALL
5. BEING SO RESTLESS THAT IT IS HARD TO SIT STILL: NOT AT ALL

## 2019-06-06 ASSESSMENT — PAIN SCALES - GENERAL: PAINLEVEL: NO PAIN (0)

## 2019-06-06 ASSESSMENT — PATIENT HEALTH QUESTIONNAIRE - PHQ9
5. POOR APPETITE OR OVEREATING: NOT AT ALL
SUM OF ALL RESPONSES TO PHQ QUESTIONS 1-9: 8

## 2019-06-06 ASSESSMENT — MIFFLIN-ST. JEOR: SCORE: 1206.62

## 2019-06-06 NOTE — LETTER
6/6/2019       RE: Polly Gonzalez  [id number] [facility] [address]     Dear Colleague,    Thank you for referring your patient, Polly Gonzalez, to the [facility] at [facility]. Please see a copy of my visit note below.    SUBJECTIVE:   Polly Gonzalez is a 78 year old female who presents for follow-up visit:   Concerns:    Living in [place] [31 acres and a single floor house]. Lives with her partner Az.  Az states she is not enthralled with the new home.     Dementia on Aricept: Will see neurologist at the end of June 2019. Can do her own ADL's.     Verbal communication is less in the last six months    Motor instability has worsened. Did fall once and Az had a difficult time getting her back on her feet.    Facial picking/excooriation persists     Getting support in the home: ADRC: Elder Benefit speicialist: Anay Alvarez: [phone].  Getting a couple hours a week [four hours].     Az is her primary support. He says he will do everything to keep her from going to a nursing home    They have not identified a physician closer to their home in [place]   - Hypothyroidism on levothyroxine 100 mcg  - Neck pain persists- uses neck brace in car.   Past Medical History:   Diagnosis Date     Asthma      GERD (gastroesophageal reflux disease)      HX: breast cancer      Hyperlipidemia LDL goal < 130      Hypothyroidism      Mild dementia      Mild major depression (H)      Past Surgical History:   Procedure Laterality Date     BLEPHAROPLASTY  09     BUNIONECTOMY DANICA BILATERAL  1993     face life  09     HERNIA REPAIR, INCISIONAL  1998    ?   LLQ     MASTECTOMY, FLAP TRAM PEDICLE, COMBINED  1992     ROTATOR CUFF REPAIR RT/LT  1990/2001     Social History     Tobacco Use     Smoking status: Never Smoker     Smokeless tobacco: Never Used   Substance Use Topics     Alcohol use: Yes     Alcohol/week: 0.0 oz     Comment: rare     Today's PHQ-2 Score:   PHQ-2  "( 1999 Pfizer) 9/6/2016 1/7/2016   Q1: Little interest or pleasure in doing things - -   Q2: Feeling down, depressed or hopeless - -   PHQ-2 Score - -   Q1: Little interest or pleasure in doing things Several days Several days   Q2: Feeling down, depressed or hopeless Several days Several days   PHQ-2 Score 2 2   ROS:  C: NEGATIVE for fever, chills, change in weight  E/M: NEGATIVE for ear, mouth and throat problems  R: NEGATIVE for significant cough or SOB  CV: NEGATIVE for chest pain, palpitations or peripheral edema  OBJECTIVE:   /74   Pulse 66   Ht 1.575 m (5' 2\")   Wt 77.8 kg (171 lb 9.6 oz)   BMI 31.39 kg/m    Estimated body mass index is 31.39 kg/m  as calculated from the following:    Height as of this encounter: 1.575 m (5' 2\").    Weight as of this encounter: 77.8 kg (171 lb 9.6 oz).  EXAM:   Well dressed - presents with male partner -  RANJAN. Flat affect and sits quietly through the visit.   Word finding difficulty - hard to complete a three word sentence. Slow to respond.   Psychological: Quiet.  Slow recall on speech  Facial excoriations     Neck: No thyroidmegaly.  Respiratory: Clear   Musculoskeletal: uses Ranjan's arm to ambulate.     Skin: facial excoriation from picking.   Neurological: normal gait, no tremor.   ASSESSMENT / PLAN:   Diagnoses and associated orders for this visit:  Dementia, progressive    - donepezil (ARICEPT) 10 MG tablet; Take 1 tablet (10 mg) by mouth daily    - Neurology consult end of June 2019  Excoriation of face, sequela  -     DERMATOLOGY REFERRAL  Mild persistent asthma, uncomplicated  -     albuterol (PROAIR HFA/PROVENTIL HFA/VENTOLIN HFA) 108 (90 Base) MCG/ACT inhaler; Inhale 2 puffs into the lungs every 6 hours as needed for shortness of breath / dyspnea or wheezing    Ghazal Lee MD  [facility]    "

## 2019-06-07 ASSESSMENT — ANXIETY QUESTIONNAIRES: GAD7 TOTAL SCORE: 2

## 2019-06-26 ENCOUNTER — OFFICE VISIT (OUTPATIENT)
Dept: NEUROLOGY | Facility: CLINIC | Age: 79
End: 2019-06-26
Attending: FAMILY MEDICINE
Payer: MEDICARE

## 2019-06-26 VITALS
SYSTOLIC BLOOD PRESSURE: 136 MMHG | HEART RATE: 59 BPM | DIASTOLIC BLOOD PRESSURE: 69 MMHG | BODY MASS INDEX: 31.59 KG/M2 | OXYGEN SATURATION: 96 % | WEIGHT: 172.7 LBS

## 2019-06-26 DIAGNOSIS — R27.0 ATAXIA: ICD-10-CM

## 2019-06-26 DIAGNOSIS — M54.2 NECK PAIN: Primary | ICD-10-CM

## 2019-06-26 ASSESSMENT — ENCOUNTER SYMPTOMS
DYSPNEA ON EXERTION: 1
NECK PAIN: 1
SPEECH CHANGE: 1
NAIL CHANGES: 0
SHORTNESS OF BREATH: 1
POSTURAL DYSPNEA: 0
COUGH DISTURBING SLEEP: 0
DISTURBANCES IN COORDINATION: 1
TINGLING: 0
DECREASED CONCENTRATION: 1
SEIZURES: 0
NERVOUS/ANXIOUS: 0
MYALGIAS: 0
WEAKNESS: 1
JOINT SWELLING: 0
MUSCLE CRAMPS: 0
STIFFNESS: 0
ARTHRALGIAS: 0
DIZZINESS: 1
BACK PAIN: 1
INSOMNIA: 0
COUGH: 1
WHEEZING: 1
HEMOPTYSIS: 0
PARALYSIS: 0
POOR WOUND HEALING: 0
PANIC: 0
HEADACHES: 0
MEMORY LOSS: 1
DEPRESSION: 0
NUMBNESS: 0
SPUTUM PRODUCTION: 0
MUSCLE WEAKNESS: 1
SNORES LOUDLY: 0
TREMORS: 1
LOSS OF CONSCIOUSNESS: 0
SKIN CHANGES: 0

## 2019-06-26 ASSESSMENT — PAIN SCALES - GENERAL: PAINLEVEL: MILD PAIN (2)

## 2019-06-26 NOTE — NURSING NOTE
Chief Complaint   Patient presents with     New Patient     [facility] NEW SENILE DEMENTIA       Kisha Souza, EMT

## 2019-06-26 NOTE — LETTER
RE: Polly Gonzalez  [id number] [facility] [id number]     Dear Colleague,    Thank you for referring your patient, Polly Gonzalez, to the [facility]UROLOGY at [facility]. Please see a copy of my visit note below.    Service Date: 2019      Ghazal Lee MD    Physicians    [address]   [place] [address]      RE: Polly Gonzalez   MRN: [id number]   : 1940      Dear Dr. Lee:      Thank you for referring Polly Gonzalez for neurologic consultation on 2019.  She came today with her caregiver who has power of , Shabbir Way.  His chief complaint is that of her progressive dementia as well as known cervical stenosis and imbalance.      The patient has been under the care of a [facility] neurologist.  He would like to now make all of her care here at the [facility].  The patient has had a long history of dementia and most recently was seen at the [facility] on 2018 by Seema Contreras MD.  He did bring those notes for my review.  Her dementia dates back at least to .  She has had gradual progression.  She still enjoys life.  She does need his care.  They live on a home nearly [place] on a [facility].  He is constantly with her.  The patient has enjoyed wildlife there.  She continues to enjoy going out to restaurants.  She does need help with bathing and typically will go into the shower after he does.  She has been incontinent for the last year and a half and has to wear diapers.  She can feed herself.  She really cannot clean and she cannot cook.  She enjoys watching wildlife.  She has not driven for 3 years.  He has paid her bills for the last 4 years.  She has had trouble for 2 years using a remote to turn on TV.  Her personality has continued to be quite pleasant.  They seem to enjoy each other.  The patient did note that she never .  She has 2 nephews.  One lives in  [place], [place] and the other [place].  They have little contact with her.  The patient did have a previous history of breast cancer and was treated here in 2000.  She has had no recurrence.  The patient has continued to follow up on a regular basis with you.  He noted that she had been tried on donepezil the last 4 years and does not think it has been that helpful.      The patient has had a history of asthma, GERD, lipid disorder, hypothyroidism and previous depression.  She is not depressed now.  She has continued on a multivitamin, D3 supplementation, levothyroxine, and p.r.n. albuterol.      The patient has been enrolled in [place] in what is called an ADR program.  This is a resource for patients with Alzheimer's.  They do have a visiting home person that comes at least twice per week and he has found it quite helpful.      The patient does continue to enjoy music and they dance together.  She has not had any current medical problems other than her balance.  Her weight has been stable.  She has not suffered from any diarrhea or constipation.  She has had a prior history of diverticulosis and she may have been on medicine, Bentyl, but he could not recall for certain.  The patient also has had facial picking and excoriation of her face the last 1-1/2 years.  This has been labeled as rosacea.  She is due to see a dermatologist here in July.  She has had trouble with her balance the last 2 years.  She has tripped outside.  It is hard for her to get up.  She has not suffered any actual injuries.  In the past, she had neck pain but currently does not seem to complain of it.  She had MRI scan testing a number of years ago that did show spinal stenosis of moderate degree at the C5-C6 level and at the C6-C7 level with mild spinal stenosis at the C3-C4 level.  She had multilevel foraminal stenosis with spondylosis.  Her MRI scan was dated on 09/19/2015.  I only have the report from the [facility].  Her last MRI scan was done on 2017 of the brain.  This was compared to a previous study of 05/15/2013.  This showed at that time moderate generalized cerebral atrophy that had slightly progressed.  She had mild to moderate presumed chronic microvascular disease that also had slightly progressed.  He does not feel that she is a candidate for any type of surgical procedure including operation on her neck and declined at this time having followup MRI scans done of her neck, nor her brain.      The patient's past surgical history includes blepharoplasty, herniorrhaphy repair, and bilateral bunionectomy as well as a facelift.      The patient's family history is known somewhat by her friend.  They have been a couple for a number of years.  She had a sister and a brother who both had cancer.  Her brother  of old age.  She has outlived her siblings.  Her father  in his 60s after bleeding into a major vessel in his neck from a cancer.  They are not certain why her mother  in her 70s.  The patient also evidently has had surgery involving both shoulder rotator cuffs.  They could recall this after telling me about other surgeries.      ALLERGIES:  She has allergies to codeine.       She does not drink and she does not smoke.      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  The patient cannot really recall any of this nor give me a history of it.  The patient has not had any REM sleep disturbance.  Her friend at one point did worry about hydrocephalus because his grandson had developed in the womb and had treatment for it at birth.  He said that now he is not worried about that condition.  He said he never was dissatisfied with [facility].  The patient has had labs recorded.  She evidently had EMG testing in  that showed she had carpal tunnel syndrome on the right.  She does not complain  "of paresthesias of her hand or pain now.  Her other labs showed that she had a positive JAY JAY screen on 09/18/2015 that showed a 1:320 dilution.  Her Lyme titer was negative and she had a negative CRP.  An AZIZA panel including anti-double stranded DNA was negative.  She had at that time a TSH of 4.47 and her B12 level was 604 and she had a ferritin level 165.  On 04/22/2013, her TSH was low at 0.22 and she had a normal B12 level of 601 picograms.  On 10/16/2015, she had a rheumatoid factor that was 9.4 and a sedimentation rate of 3.  On 12/12/2017, she had normal liver function tests and a normal complete blood count.  Her vitamin D level was 33.9 and her B12 level was 629 picograms.  On 12/05/2018, she had normal labs with a creatinine of 0.92 and her TSH was 2.00.  Her CBC was normal.      The patient has had labs also available through [facility] which I reviewed with them.  She had on 12/05/2018 a total cholesterol of 244 and an HDL of 54 and an LDL of 142.  Her triglycerides were 240.      The patient had been tried on Namenda according to the Neurology records from the [facility].  Her caregiver could recall that she had been given some medicine and that it did not work and it was stopped.      Neurologic examination revealed a pleasant woman.  She knew her friend, Shabbir Way, by first name.  She could tell me her name.  She could not tell me the day of the week, nor the month.  She said it was then \"July.\"  She could not tell me the year.  She had obvious word finding problems and did tend to confabulate and had perseveration.  She knew she was in a doctor's office.  The patient could not tell me where she was otherwise, including city or state.  It took her a number of times to register 3 objects and she could not recall any after 3 minutes.  She had a slightly wide-based gait.  She could not do tandem and had swaying on Romberg.  The patient was not obviously does symmetric.  She had some dyspraxia so it " was hard to tell for certain.  With the dyspraxia, it was somewhat hard to tell strength testing, but it appeared to be unremarkable.  The patient's reflexes were hypoactive in the arms, trace at the knees, absent at the ankles and her toes were downgoing to plantar stimulation.  She had negative Simba reflexes.  The patient had negative grasp reflexes but prominent palmomental reflexes.  She had normal extraocular movements and blink to threat bilaterally.  Her discs were flat.  I could not auscultate cervical bruits.  She had a regular cardiac rhythm without gallops or murmurs.  She seemed to be able to perceive vibratory sense testing, but could not really cooperate for position sense testing.  She had no spasticity or rigidity including extrapyramidal findings.      IMPRESSION:   1.  Progressive Alzheimer disease.   2.  Known cervical stenosis.      The patient's caregiver, Shabbir Way, would like to limit testing.  He did not feel that any further labs would be necessary, nor MRI scan imaging.  I have talked with him about the possibility of her trying rivastigmine.  He may have an interest.  He is going to consider this, but I did not give a prescription today.  I did go over the strengths and would recommend that once he is done with donepezil 10 mg every day on a current prescription, he could switch over to the skin patch, the 4.6 mg dose daily.  I told him the importance of using the medication was to watch for functioning.  The patient is going to continue to work with the [facility] in [place].  She has progressive dementia and does need continued supportive care.  I talked about the possibility of a pain clinic, but it is hard to tell how much neck pain she has if any.  She is a candidate to be seen here and I did give the recommendation for it.  I also talked about physical therapy for balance and for possible neck pain.  They are interested in that and it could be done through their local  [facility] in [place].  I told them I would be happy to see her again or someone else in this clinic in the next 6-12 months and on a p.r.n. basis.  He understands that she has a progressive condition and will need eventual placement.  He would like to continue to care for her as they do have a very good relationship and he is committed to her.        Thank you for allowing me to see this patient.       Sincerely yours,       Kapil Ayala MD      I spent 1 hour with the patient and her friend.  Over 50% of the time this involved counseling and coordination of care.  A complete review of medical systems was done and a positive review is listed in the report above.

## 2019-07-01 NOTE — PROGRESS NOTES
Service Date: 2019      Ghazal Lee MD    Physicians    [address]   [place] [address]      RE: Polly Gonzalez   MRN: [id number]   : 1940      Dear Dr. Lee:      Thank you for referring Polly Gonzalez for neurologic consultation on 2019.  She came today with her caregiver who has power of , Shabbir Yoantiffani.  His chief complaint is that of her progressive dementia as well as known cervical stenosis and imbalance.      The patient has been under the care of a [facility] neurologist.  He would like to now make all of her care here at the [facility].  The patient has had a long history of dementia and most recently was seen at the [facility] on 2018 by Seema Contreras MD.  He did bring those notes for my review.  Her dementia dates back at least to .  She has had gradual progression.  She still enjoys life.  She does need his care.  They live on a home nearly [place] on [address] [facility].  He is constantly with her.  The patient has enjoyed wildlife there.  She continues to enjoy going out to restaurants.  She does need help with bathing and typically will go into the shower after he does.  She has been incontinent for the last year and a half and has to wear diapers.  She can feed herself.  She really cannot clean and she cannot cook.  She enjoys watching wildlife.  She has not driven for 3 years.  He has paid her bills for the last 4 years.  She has had trouble for 2 years using a remote to turn on TV.  Her personality has continued to be quite pleasant.  They seem to enjoy each other.  The patient did note that she never .  She has 2 nephews.  One lives in [place], [place] and the other [place].  They have little contact with her.  The patient did have a previous history of breast cancer and was treated here in .  She has had no recurrence.  The patient has continued to follow up on a regular basis with  you.  He noted that she had been tried on donepezil the last 4 years and does not think it has been that helpful.      The patient has had a history of asthma, GERD, lipid disorder, hypothyroidism and previous depression.  She is not depressed now.  She has continued on a multivitamin, D3 supplementation, levothyroxine, and p.r.n. albuterol.      The patient has been enrolled in [place] in what is called an [facility] program.  This is a resource for patients with Alzheimer's.  They do have a visiting home person that comes at least twice per week and he has found it quite helpful.      The patient does continue to enjoy music and they dance together.  She has not had any current medical problems other than her balance.  Her weight has been stable.  She has not suffered from any diarrhea or constipation.  She has had a prior history of diverticulosis and she may have been on medicine, Bentyl, but he could not recall for certain.  The patient also has had facial picking and excoriation of her face the last 1-1/2 years.  This has been labeled as rosacea.  She is due to see a dermatologist here in July.  She has had trouble with her balance the last 2 years.  She has tripped outside.  It is hard for her to get up.  She has not suffered any actual injuries.  In the past, she had neck pain but currently does not seem to complain of it.  She had MRI scan testing a number of years ago that did show spinal stenosis of moderate degree at the C5-C6 level and at the C6-C7 level with mild spinal stenosis at the C3-C4 level.  She had multilevel foraminal stenosis with spondylosis.  Her MRI scan was dated on 09/19/2015.  I only have the report from the [facility].  Her last MRI scan was done on 12/12/2017 of the brain.  This was compared to a previous study of 05/15/2013.  This showed at that time moderate generalized cerebral atrophy that had slightly progressed.  She had mild to moderate presumed chronic  microvascular disease that also had slightly progressed.  He does not feel that she is a candidate for any type of surgical procedure including operation on her neck and declined at this time having followup MRI scans done of her neck, nor her brain.      The patient's past surgical history includes blepharoplasty, herniorrhaphy repair, and bilateral bunionectomy as well as a facelift.      The patient's family history is known somewhat by her friend.  They have been a couple for a number of years.  She had a sister and a brother who both had cancer.  Her brother  of old age.  She has outlived her siblings.  Her father  in his 60s after bleeding into a major vessel in his neck from a cancer.  They are not certain why her mother  in her 70s.  The patient also evidently has had surgery involving both shoulder rotator cuffs.  They could recall this after telling me about other surgeries.      ALLERGIES:  She has allergies to codeine.       She does not drink and she does not smoke.      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  The patient cannot really recall any of this nor give me a history of it.  The patient has not had any REM sleep disturbance.  Her friend at one point did worry about hydrocephalus because his grandson had developed in the womb and had treatment for it at birth.  He said that now he is not worried about that condition.  He said he never was dissatisfied with the Neurology care at the [facility].  The patient has had labs recorded.  She evidently had EMG testing in  that showed she had carpal tunnel syndrome on the right.  She does not complain of paresthesias of her hand or pain now.  Her other labs showed that she had a positive JAY JAY screen on 2015 that showed a 1:320 dilution.  Her Lyme titer was negative and she had a negative CRP.  An AZIZA panel including anti-double stranded DNA was  "negative.  She had at that time a TSH of 4.47 and her B12 level was 604 and she had a ferritin level 165.  On 04/22/2013, her TSH was low at 0.22 and she had a normal B12 level of 601 picograms.  On 10/16/2015, she had a rheumatoid factor that was 9.4 and a sedimentation rate of 3.  On 12/12/2017, she had normal liver function tests and a normal complete blood count.  Her vitamin D level was 33.9 and her B12 level was 629 picograms.  On 12/05/2018, she had normal labs with a creatinine of 0.92 and her TSH was 2.00.  Her CBC was normal.      The patient has had labs also available through [facility] which I reviewed with them.  She had on 12/05/2018 a total cholesterol of 244 and an HDL of 54 and an LDL of 142.  Her triglycerides were 240.      The patient had been tried on Namenda according to the Neurology records from the [facility].  Her caregiver could recall that she had been given some medicine and that it did not work and it was stopped.      Neurologic examination revealed a pleasant woman.  She knew her friend, Shabbir Way, by first name.  She could tell me her name.  She could not tell me the day of the week, nor the month.  She said it was then \"July.\"  She could not tell me the year.  She had obvious word finding problems and did tend to confabulate and had perseveration.  She knew she was in a doctor's office.  The patient could not tell me where she was otherwise, including city or state.  It took her a number of times to register 3 objects and she could not recall any after 3 minutes.  She had a slightly wide-based gait.  She could not do tandem and had swaying on Romberg.  The patient was not obviously does symmetric.  She had some dyspraxia so it was hard to tell for certain.  With the dyspraxia, it was somewhat hard to tell strength testing, but it appeared to be unremarkable.  The patient's reflexes were hypoactive in the arms, trace at the knees, absent at the ankles and her toes were downgoing " to plantar stimulation.  She had negative Simba reflexes.  The patient had negative grasp reflexes but prominent palmomental reflexes.  She had normal extraocular movements and blink to threat bilaterally.  Her discs were flat.  I could not auscultate cervical bruits.  She had a regular cardiac rhythm without gallops or murmurs.  She seemed to be able to perceive vibratory sense testing, but could not really cooperate for position sense testing.  She had no spasticity or rigidity including extrapyramidal findings.      IMPRESSION:   1.  Progressive Alzheimer disease.   2.  Known cervical stenosis.      The patient's caregiver, Shabbir Way, would like to limit testing.  He did not feel that any further labs would be necessary, nor MRI scan imaging.  I have talked with him about the possibility of her trying rivastigmine.  He may have an interest.  He is going to consider this, but I did not give a prescription today.  I did go over the strengths and would recommend that once he is done with donepezil 10 mg every day on a current prescription, he could switch over to the skin patch, the 4.6 mg dose daily.  I told him the importance of using the medication was to watch for functioning.  The patient is going to continue to work with the [facility] in [place].  She has progressive dementia and does need continued supportive care.  I talked about the possibility of a pain clinic, but it is hard to tell how much neck pain she has if any.  She is a candidate to be seen here and I did give the recommendation for it.  I also talked about physical therapy for balance and for possible neck pain.  They are interested in that and it could be done through their local hospital in [place].  I told them I would be happy to see her again or someone else in this clinic in the next 6-12 months and on a p.r.n. basis.  He understands that she has a progressive condition and will need eventual placement.  He would like to continue  to care for her as they do have a very good relationship and he is committed to her.        Thank you for allowing me to see this patient.       Sincerely yours,       Clem Ayala MD      I spent 1 hour with the patient and her friend.  Over 50% of the time this involved counseling and coordination of care.  A complete review of medical systems was done and a positive review is listed in the report above.         CLEM AYALA MD             D: 2019   T: 2019   MT: AKA      Name:     YAKELIN VALDOVINOS   MRN:      [id number]        Account:      [id number]   :      1940           Service Date: 2019      Document: [id number]

## 2019-07-09 ENCOUNTER — OFFICE VISIT (OUTPATIENT)
Dept: DERMATOLOGY | Facility: CLINIC | Age: 79
End: 2019-07-09
Attending: FAMILY MEDICINE
Payer: MEDICARE

## 2019-07-09 DIAGNOSIS — L98.1 NEUROTIC EXCORIATIONS: Primary | ICD-10-CM

## 2019-07-09 RX ORDER — MINOCYCLINE HYDROCHLORIDE 50 MG/1
50 CAPSULE ORAL 2 TIMES DAILY
Qty: 60 CAPSULE | Refills: 2 | Status: SHIPPED | OUTPATIENT
Start: 2019-07-09 | End: 2021-05-26

## 2019-07-09 ASSESSMENT — PAIN SCALES - GENERAL: PAINLEVEL: NO PAIN (0)

## 2019-07-09 NOTE — PROGRESS NOTES
[facility] Dermatology Note      Dermatology Problem List:  1.Neurotic excoriations: ?background inflamatory process (rosacea vs seb derm vs folliculitis)   - Tx: minocycline 50 mg BID; NAC w/ up-titration to target 3000 mg daily in divided dose  2.Seborrheic keratoses: benign nature discussed    Encounter Date: Jul 9, 2019    CC:   Chief Complaint   Patient presents with     Derm Problem     Polly is here today for excoriations, flushing, and  blisters. Patient states they are itchy and painful. Has been diagnosed with Rosacea previously.          History of Present Illness:  Ms. Polly Gonzalez is a 78 year old female who presents as a referral from Ghazal Lee MD ([facility]) for evaluation of excoriation secondary to picking.    History is obtained largely from caregiver / POA.     Caregiver notes intermittent eruptions of acneiform papules across the bilateral cheeks.  He states that she has previously carried a diagnosis of rosacea and was treated with an oral medication, but he is uncertain what this medication is.  He notes that he is well rubbed and are very pruritic.  The patient will scratch at them and pick at them resulting in the excoriations that are observed today.  She will sometimes be so aggressive that he will have to apply a Band-Aid and some bacitracin to her face.  He recently changed from Dove gentle skin to goat milk soap with lye and has not noticed a significant difference.  Notably without any treatment for rosacea on chart review.  No previous allergic eruptions per caregiver/patient report.  No history of skin cancer.  No additional concerning lesions.  She notably does carry a allergy to both cats and dust mites, but has never had cutaneous contact reaction per caregiver.  Otherwise follows close with both neurology, psychiatry and primary care for progressive dementia.      Past Medical History:   Patient Active Problem List   Diagnosis      Fatigue     Mild major depression (H)     Hypothyroidism     Lumbago     Mild persistent asthma     Memory loss     Total body pain     History of colonic polyps     Acute asthma     Asthma     GERD (gastroesophageal reflux disease)     High cholesterol     Multiple allergies     Past Medical History:   Diagnosis Date     Asthma      GERD (gastroesophageal reflux disease)      HX: breast cancer      Hyperlipidemia LDL goal < 130      Hypothyroidism      Mild dementia      Mild major depression (H)      Past Surgical History:   Procedure Laterality Date     BLEPHAROPLASTY  09     BUNIONECTOMY DANICA BILATERAL  1993     face life  09     HERNIA REPAIR, INCISIONAL  1998    ?   LLQ     MASTECTOMY, FLAP TRAM PEDICLE, COMBINED  1992     ROTATOR CUFF REPAIR RT/LT  1990/2001       Social History:  Patient reports that she has never smoked. She has never used smokeless tobacco. She reports that she drinks alcohol. She reports that she does not use drugs.    Family History:  Family History   Problem Relation Age of Onset     Cancer - colorectal Sister 60     Cancer Brother         kidney cancer     Glaucoma No family hx of      Macular Degeneration No family hx of        Medications:  Current Outpatient Medications   Medication Sig Dispense Refill     albuterol (2.5 MG/3ML) 0.083% nebulizer solution Inhale 2.5 mg into the lungs       albuterol (PROAIR HFA/PROVENTIL HFA/VENTOLIN HFA) 108 (90 Base) MCG/ACT inhaler Inhale 2 puffs into the lungs every 6 hours as needed for shortness of breath / dyspnea or wheezing 1 Inhaler 1     cholecalciferol (VITAMIN D3) 5000 units TABS tablet Take by mouth daily       donepezil (ARICEPT) 10 MG tablet Take 1 tablet (10 mg) by mouth daily 90 tablet 3     ibuprofen (ADVIL/MOTRIN) 200 MG tablet Take 200 mg by mouth every 4 hours as needed for mild pain       levothyroxine (SYNTHROID/LEVOTHROID) 100 MCG tablet TAKE ONE TABLET BY MOUTH ONCE DAILY 90 tablet 3     lidocaine (XYLOCAINE) 2 % jelly  Apply topically as needed for moderate pain       Multiple Vitamins-Minerals (MULTIVITAMIN ADULT PO)        psyllium (METAMUCIL/KONSYL) packet Take 1 packet by mouth daily       mupirocin (BACTROBAN) 2 % external ointment Apply topically 3 times daily (Patient not taking: Reported on 6/6/2019) 22 g 1        Allergies   Allergen Reactions     Animal Dander      CATS     Codeine Unknown     Dust Mites      DUST     Morphine Nausea and Vomiting     Other [Seasonal Allergies]      UNKNOWN     Prozac [Fluoxetine]      Shakes; couldn't stand up         Review of Systems:  -As per HPI  -Constitutional: Otherwise feeling well today, in usual state of health.  -HEENT: Patient denies nonhealing oral sores.  -Skin: As above in HPI. No additional skin concerns.    Physical exam:  Vitals: There were no vitals taken for this visit.  GEN: Pleasantly demented female in no acute distress.  NEURO: Dysarthric and aphasic speech.  No focal deficits observed.  SKIN: Waist up skin exam of the abdomen, chest, back, neck, face, scalp, bilateral upper extremities, digits and/or nails  -Pinpoint excoriations with ulceration scattered throughout the bilateral cheeks, nose and forehead.  -Background scattered infrequent acneiform lesions on the bilateral cheeks  -Scattered flesh to tan-colored papules on the trunk  -There are waxy stuck on tan to brown papules on the trunk and bilateral extremities.  -No other lesions of concern on areas examined.     Impression/Plan:  1. Neurotic excoriations: Patient denies active pruritus or being aware of picking; however, notably less inflamed today per caregiver and questionable history.  Given history of rosacea per caregiver report (not observed on chart review), there is some concern for a background inflammatory process (rosacea vs foliculitis vs seb derm) with resultant pruritus and picking.  Will offer low-dose minocycline for anti-inflammatory effect and a trial of  N-acetylcysteine (see article  dated 05/1/2016 PMID: [id number] DOI: 10.1001/jamapsychiatry.2016.0060).    N-acetylcysteine 600 mg BID x 2 weeks, 1200 mg BID x 3 weeks and 3000 mg in split dosing thereafter    Minocycline 50 mg BID x 3 months    RTC in 3 mo to assess response to the above    2. Seborrheic keratosis, non irritated    ABCDs of melanoma were discussed and self skin checks were advised.     Reassurance provided and benign nature of condition discussed      CC Ghazal Lee MD  [address] [facility] [address]  [place] [address] on close of this encounter.  Follow-up in 3 months, earlier for new or changing lesions.        staffed the patient.    Staff Involved:  Resident(Geovany Hendrickson)/Staff    Staff Physician Comments:   I saw and evaluated the patient with the resident and I agree with the assessment and plan.  I was present for the examination. I have made edits if needed.    Von Agarwal MD  Staff Dermatologist and Dermatopathologist  , Department of Dermatology

## 2019-07-09 NOTE — NURSING NOTE
Chief Complaint   Patient presents with     Derm Problem     Polly is here today for excoriations, flushing, and  blisters. Patient states they are itchy and painful. Has been diagnosed with Rosacea previously.      Shani Kaufman LPN

## 2019-07-09 NOTE — PATIENT INSTRUCTIONS
N-Acetylcysteine in the Treatment of Excoriation Disorder    Study Design  Eligible participants were assigned to 12 weeks of double-blind N-acetylcysteine or placebo treatment. Each [place] [facility] investigational pharmacy randomized all participants (block sizes of 8, using computer-generated randomization with no clinical information) to either the N-acetylcysteine or matching placebo group in a 1:1 fashion. All participants were seen every 3 weeks during the 12-week period. The initial dose of N-acetylcysteine was 1200 mg/d and was increased to 2400 mg/d by week 3. At week 6, the dose was increased to 3000 mg/d for the remaining 6 weeks of the study. If clinically necessary, the dose was raised more slowly or the target dose of 3000 mg/d was not reached. Because N-acetylcysteine often emits an odor of sulfur, to protect the blinding, all participants were told at study entry that their medication may or may not have an odor and that the effectiveness of N-acetylcysteine was independent of this odor. No participant ever mentioned the odor.    The dose range was based on safety and efficacy data from studies using N-acetylcysteine, although the dose in the present study was increased to 3000 mg/d instead of the 2400-mg/d dose used in the previous trichotillomania study.21 We selected this higher maximal dose because the dose of 2400 mg/d used in the trichotillomania trial was very well tolerated, suggesting that higher doses merited exploration in subsequent trials. No participants were withdrawn from the study because of nonadherence to the study medication (ie, failing to take medication for ?3 consecutive days).    So - N-Acetylcysteine dosing:  Weeks 1-2: 600mg AM and 600mg PM  Weeks 3-6: 1200mg AM and 1200mg PM  Weeks 7-12: add one extra pill (either AM or PM)

## 2019-07-09 NOTE — LETTER
7/9/2019       RE: Polly Gonzalez  [id number] [facility] [address]     Dear Colleague,    Thank you for referring your patient, Polly Gonzalez, to the [facility] DERMATOLOGY at [facility]. Please see a copy of my visit note below.    [facility] Dermatology Note      Dermatology Problem List:  1.Neurotic excoriations: ?background inflamatory process (rosacea vs seb derm vs folliculitis)   - Tx: minocycline 50 mg BID; NAC w/ up-titration to target 3000 mg daily in divided dose  2.Seborrheic keratoses: benign nature discussed    Encounter Date: Jul 9, 2019    CC:   Chief Complaint   Patient presents with     Derm Problem     Polly is here today for excoriations, flushing, and  blisters. Patient states they are itchy and painful. Has been diagnosed with Rosacea previously.          History of Present Illness:  Ms. Polly Gonzalez is a 78 year old female who presents as a referral from Ghazal Lee MD ([facility]) for evaluation of excoriation secondary to picking.    History is obtained largely from caregiver / POA.     Caregiver notes intermittent eruptions of acneiform papules across the bilateral cheeks.  He states that she has previously carried a diagnosis of rosacea and was treated with an oral medication, but he is uncertain what this medication is.  He notes that he is well rubbed and are very pruritic.  The patient will scratch at them and pick at them resulting in the excoriations that are observed today.  She will sometimes be so aggressive that he will have to apply a Band-Aid and some bacitracin to her face.  He recently changed from Dove gentle skin to goat milk soap with lye and has not noticed a significant difference.  Notably without any treatment for rosacea on chart review.  No previous allergic eruptions per caregiver/patient report.  No history of skin cancer.  No additional concerning lesions.  She notably does carry  a allergy to both cats and dust mites, but has never had cutaneous contact reaction per caregiver.  Otherwise follows close with both neurology, psychiatry and primary care for progressive dementia.      Past Medical History:   Patient Active Problem List   Diagnosis     Fatigue     Mild major depression (H)     Hypothyroidism     Lumbago     Mild persistent asthma     Memory loss     Total body pain     History of colonic polyps     Acute asthma     Asthma     GERD (gastroesophageal reflux disease)     High cholesterol     Multiple allergies     Past Medical History:   Diagnosis Date     Asthma      GERD (gastroesophageal reflux disease)      HX: breast cancer      Hyperlipidemia LDL goal < 130      Hypothyroidism      Mild dementia      Mild major depression (H)      Past Surgical History:   Procedure Laterality Date     BLEPHAROPLASTY  09     BUNIONECTOMY DANICA BILATERAL  1993     face life  09     HERNIA REPAIR, INCISIONAL  1998    ?   LLQ     MASTECTOMY, FLAP TRAM PEDICLE, COMBINED  1992     ROTATOR CUFF REPAIR RT/LT  1990/2001       Social History:  Patient reports that she has never smoked. She has never used smokeless tobacco. She reports that she drinks alcohol. She reports that she does not use drugs.    Family History:  Family History   Problem Relation Age of Onset     Cancer - colorectal Sister 60     Cancer Brother         kidney cancer     Glaucoma No family hx of      Macular Degeneration No family hx of        Medications:  Current Outpatient Medications   Medication Sig Dispense Refill     albuterol (2.5 MG/3ML) 0.083% nebulizer solution Inhale 2.5 mg into the lungs       albuterol (PROAIR HFA/PROVENTIL HFA/VENTOLIN HFA) 108 (90 Base) MCG/ACT inhaler Inhale 2 puffs into the lungs every 6 hours as needed for shortness of breath / dyspnea or wheezing 1 Inhaler 1     cholecalciferol (VITAMIN D3) 5000 units TABS tablet Take by mouth daily       donepezil (ARICEPT) 10 MG tablet Take 1 tablet (10 mg) by  mouth daily 90 tablet 3     ibuprofen (ADVIL/MOTRIN) 200 MG tablet Take 200 mg by mouth every 4 hours as needed for mild pain       levothyroxine (SYNTHROID/LEVOTHROID) 100 MCG tablet TAKE ONE TABLET BY MOUTH ONCE DAILY 90 tablet 3     lidocaine (XYLOCAINE) 2 % jelly Apply topically as needed for moderate pain       Multiple Vitamins-Minerals (MULTIVITAMIN ADULT PO)        psyllium (METAMUCIL/KONSYL) packet Take 1 packet by mouth daily       mupirocin (BACTROBAN) 2 % external ointment Apply topically 3 times daily (Patient not taking: Reported on 6/6/2019) 22 g 1        Allergies   Allergen Reactions     Animal Dander      CATS     Codeine Unknown     Dust Mites      DUST     Morphine Nausea and Vomiting     Other [Seasonal Allergies]      UNKNOWN     Prozac [Fluoxetine]      Shakes; couldn't stand up         Review of Systems:  -As per HPI  -Constitutional: Otherwise feeling well today, in usual state of health.  -HEENT: Patient denies nonhealing oral sores.  -Skin: As above in HPI. No additional skin concerns.    Physical exam:  Vitals: There were no vitals taken for this visit.  GEN: Pleasantly demented female in no acute distress.  NEURO: Dysarthric and aphasic speech.  No focal deficits observed.  SKIN: Waist up skin exam of the abdomen, chest, back, neck, face, scalp, bilateral upper extremities, digits and/or nails  -Pinpoint excoriations with ulceration scattered throughout the bilateral cheeks, nose and forehead.  -Background scattered infrequent acneiform lesions on the bilateral cheeks  -Scattered flesh to tan-colored papules on the trunk  -There are waxy stuck on tan to brown papules on the trunk and bilateral extremities.  -No other lesions of concern on areas examined.     Impression/Plan:  1. Neurotic excoriations: Patient denies active pruritus or being aware of picking; however, notably less inflamed today per caregiver and questionable history.  Given history of rosacea per caregiver report (not  observed on chart review), there is some concern for a background inflammatory process (rosacea vs foliculitis vs seb derm) with resultant pruritus and picking.  Will offer low-dose minocycline for anti-inflammatory effect and a trial of  N-acetylcysteine (see article dated 05/1/2016 PMID: [id number] DOI: 10.1001/jamapsychiatry.2016.0060).    N-acetylcysteine 600 mg BID x 2 weeks, 1200 mg BID x 3 weeks and 3000 mg in split dosing thereafter    Minocycline 50 mg BID x 3 months    RTC in 3 mo to assess response to the above    2. Seborrheic keratosis, non irritated    ABCDs of melanoma were discussed and self skin checks were advised.     Reassurance provided and benign nature of condition discussed    CC Ghazal Lee MD  [address] [facility] [address]  [place] [address] on close of this encounter.  Follow-up in 3 months, earlier for new or changing lesions.      staffed the patient.    Staff Involved:  Resident(Geovany Hendrickson)/Staff    Again, thank you for allowing me to participate in the care of your patient.      Sincerely,    Von Agarwal MD

## 2019-07-22 ENCOUNTER — OFFICE VISIT (OUTPATIENT)
Dept: ANESTHESIOLOGY | Facility: CLINIC | Age: 79
End: 2019-07-22
Attending: PSYCHIATRY & NEUROLOGY
Payer: MEDICARE

## 2019-07-22 VITALS
RESPIRATION RATE: 16 BRPM | DIASTOLIC BLOOD PRESSURE: 68 MMHG | HEIGHT: 62 IN | BODY MASS INDEX: 31.65 KG/M2 | SYSTOLIC BLOOD PRESSURE: 121 MMHG | WEIGHT: 172 LBS | HEART RATE: 50 BPM

## 2019-07-22 DIAGNOSIS — M48.02 CERVICAL STENOSIS OF SPINE: Primary | ICD-10-CM

## 2019-07-22 ASSESSMENT — PAIN SCALES - GENERAL: PAINLEVEL: SEVERE PAIN (7)

## 2019-07-22 ASSESSMENT — ENCOUNTER SYMPTOMS
JOINT SWELLING: 0
SPUTUM PRODUCTION: 0
SKIN CHANGES: 0
COUGH DISTURBING SLEEP: 0
DISTURBANCES IN COORDINATION: 1
NAIL CHANGES: 0
STIFFNESS: 0
DIZZINESS: 1
FATIGUE: 1
HEMOPTYSIS: 0
NERVOUS/ANXIOUS: 1
MEMORY LOSS: 1
ALTERED TEMPERATURE REGULATION: 1
CHILLS: 1
SNORES LOUDLY: 0
MYALGIAS: 0
POLYDIPSIA: 0
SPEECH CHANGE: 1
NIGHT SWEATS: 1
BACK PAIN: 0
WHEEZING: 1
NECK PAIN: 1
INSOMNIA: 0
MUSCLE CRAMPS: 1
DEPRESSION: 1
HALLUCINATIONS: 0
DECREASED APPETITE: 0
WEIGHT GAIN: 0
ARTHRALGIAS: 0
SEIZURES: 0
TINGLING: 1
WEIGHT LOSS: 0
HEADACHES: 1
COUGH: 1
FEVER: 0
SHORTNESS OF BREATH: 1
POSTURAL DYSPNEA: 1
DYSPNEA ON EXERTION: 1
PANIC: 0
LOSS OF CONSCIOUSNESS: 0
INCREASED ENERGY: 1
MUSCLE WEAKNESS: 1
DECREASED CONCENTRATION: 1

## 2019-07-22 ASSESSMENT — PATIENT HEALTH QUESTIONNAIRE - PHQ9: SUM OF ALL RESPONSES TO PHQ QUESTIONS 1-9: 2

## 2019-07-22 ASSESSMENT — ANXIETY QUESTIONNAIRES
6. BECOMING EASILY ANNOYED OR IRRITABLE: NOT AT ALL
GAD7 TOTAL SCORE: 6
7. FEELING AFRAID AS IF SOMETHING AWFUL MIGHT HAPPEN: NOT AT ALL
2. NOT BEING ABLE TO STOP OR CONTROL WORRYING: SEVERAL DAYS
3. WORRYING TOO MUCH ABOUT DIFFERENT THINGS: SEVERAL DAYS
4. TROUBLE RELAXING: SEVERAL DAYS
GAD7 TOTAL SCORE: 6
1. FEELING NERVOUS, ANXIOUS, OR ON EDGE: MORE THAN HALF THE DAYS
7. FEELING AFRAID AS IF SOMETHING AWFUL MIGHT HAPPEN: NOT AT ALL
5. BEING SO RESTLESS THAT IT IS HARD TO SIT STILL: SEVERAL DAYS

## 2019-07-22 ASSESSMENT — MIFFLIN-ST. JEOR: SCORE: 1208.44

## 2019-07-22 NOTE — PATIENT INSTRUCTIONS
1. Continue taking Ibuprofen, as needed. (Maximum of 2000 mg per day.)    2. Can take up to 3,000 mg of Tylenol per day- as needed for pain.     3. External order for Pool therapy given.   Call your insurance to verify coverage of therapy and for specifics on locations to schedule.     Follow up: As needed in clinic.     We are relocating to the 5th floor after August 2, 2019. If your next appointment is after August 2nd, check in on the 5th floor to be seen for your next appointment.      To speak with a nurse, schedule/reschedule/cancel a clinic appointment, or request a medication refill call: [phone]     You can also reach us by [facility]: [email or web address]    For refills, please call on Monday, 1 week before your medication runs out. The doctors are not always in clinic, so this gives us time to get your prescriptions ready.  Please let us know the name of the medication you are requesting a refill of.

## 2019-07-22 NOTE — NURSING NOTE
LPN reviewed AVS with Pt.  Pt verbalized an understanding of information, and was asked to contact clinic with questions.    Ying Perez LPN

## 2019-07-22 NOTE — PROGRESS NOTES
[facility] Consultation    Date of visit: 7/22/2019    Reason for consultation:    Polly Gonzalez is a 78 year old female who is seen in consultation today at the request of her provider, Dr Ayala.    Primary Care Provider is Ghazal Lee.  Pain medications are being prescribed by her PCP.    Please see the [facility] health questionnaire which the patient completed and reviewed with me in detail.    Chief Complaint:    Chief Complaint   Patient presents with     Pain Management     New consult        Pain history:  Polly Gonzalez is a 78 year old female with medical history significant for progressive dementia as well as cervical stenosis. Due to her progressive dementia, the history was provided by her significant other.  He states that she has had neck and arm/hand pain for approximately 4 to 5 years. He attributes it to a fall that she experienced approximately 5 years ago.  The pain is difficult to describe, but he notes that when she moves her head she winces in pain. He states that sometimes he just notices her making grimaces or other facial expressions that he translates as neck pain. He states that she used to be able to describe her pain significantly better and that the pain used to radiate into both of her hands, especially in the thumb region. However, he states that that has improved and the pain no longer radiates into her hands. She has also had an EMG (approximately 3 years ago) that did not demonstrate radiculopathy.      Her current pain management regimen includes taking PRN ibuprofen. She takes up to 800mg of Ibuprofen at a time several two to three times per day, but not every day. She has done physical therapy in the past, but not recently. Her significant other believes she would be able to engage in PT again if this was recommended.     Pain rating: intensity unable to assess due to cognitive status  Aggravating factors  include: movements, weather, otherwise unable to assess due to cognitive status  Relieving factors include: ibuprofen  Any bowel or bladder incontinence: n/a    Current treatments include:  Ibuprofen 800mg BID, up to 3 times per week (not daily)    Previous medication treatments included:  Lidocaine 5% ointment    Other treatments have included:  Polly Gonzalez has not been seen at a pain clinic in the past.    PT: yes  Acupuncture: no  TENs Unit: no  Injections: yes, 4 years ago underwent cervical RFA    Past Medical History:  Past Medical History:   Diagnosis Date     Asthma      GERD (gastroesophageal reflux disease)      HX: breast cancer      Hyperlipidemia LDL goal < 130      Hypothyroidism      Mild dementia      Mild major depression (H)      Patient Active Problem List    Diagnosis Date Noted     Asthma 10/11/2017     Priority: Medium     GERD (gastroesophageal reflux disease) 10/11/2017     Priority: Medium     High cholesterol 10/11/2017     Priority: Medium     Multiple allergies 10/11/2017     Priority: Medium     Acute asthma 12/03/2016     Priority: Medium     History of colonic polyps 06/09/2016     Priority: Medium     4 mm tubular adenoma 12/2012 [facility]. 5 year return if has low risk from procedure at that time (12/2017) and very good life expectancy.       Memory loss 06/30/2015     Priority: Medium     Total body pain 06/30/2015     Priority: Medium     Mild persistent asthma 08/19/2014     Priority: Medium     Lumbago 07/03/2013     Priority: Medium     Hypothyroidism 06/18/2013     Priority: Medium     Fatigue 03/07/2013     Priority: Medium     Mild major depression (H) 03/07/2013     Priority: Medium       Past Surgical History:  Past Surgical History:   Procedure Laterality Date     BLEPHAROPLASTY  09     BUNIONECTOMY DANICA BILATERAL  1993     face life  09     HERNIA REPAIR, INCISIONAL  1998    ?   LLQ     MASTECTOMY, FLAP TRAM PEDICLE, COMBINED  1992     ROTATOR CUFF REPAIR RT/LT  1990/2001      Medications:  Current Outpatient Medications   Medication Sig Dispense Refill     albuterol (2.5 MG/3ML) 0.083% nebulizer solution Inhale 2.5 mg into the lungs       albuterol (PROAIR HFA/PROVENTIL HFA/VENTOLIN HFA) 108 (90 Base) MCG/ACT inhaler Inhale 2 puffs into the lungs every 6 hours as needed for shortness of breath / dyspnea or wheezing 1 Inhaler 1     cholecalciferol (VITAMIN D3) 5000 units TABS tablet Take by mouth daily       donepezil (ARICEPT) 10 MG tablet Take 1 tablet (10 mg) by mouth daily 90 tablet 3     ibuprofen (ADVIL/MOTRIN) 200 MG tablet Take 200 mg by mouth every 4 hours as needed for mild pain       levothyroxine (SYNTHROID/LEVOTHROID) 100 MCG tablet TAKE ONE TABLET BY MOUTH ONCE DAILY 90 tablet 3     lidocaine (XYLOCAINE) 2 % jelly Apply topically as needed for moderate pain       minocycline (MINOCIN/DYNACIN) 50 MG capsule Take 1 capsule (50 mg) by mouth 2 times daily 60 capsule 2     Multiple Vitamins-Minerals (MULTIVITAMIN ADULT PO)        psyllium (METAMUCIL/KONSYL) packet Take 1 packet by mouth daily       mupirocin (BACTROBAN) 2 % external ointment Apply topically 3 times daily (Patient not taking: Reported on 6/6/2019) 22 g 1     Allergies:     Allergies   Allergen Reactions     Animal Dander      CATS     Codeine Unknown     Dust Mites      DUST     Morphine Nausea and Vomiting     Other [Seasonal Allergies]      UNKNOWN     Prozac [Fluoxetine]      Shakes; couldn't stand up     Social History:  Home situation: lives with significant other  Occupation/Schooling: Former supervisor  Tobacco use: no  Alcohol use: no  Drug use: no  History of chemical dependency treatment: no    Family history:  Family History   Problem Relation Age of Onset     Cancer - colorectal Sister 60     Cancer Brother         kidney cancer     Glaucoma No family hx of      Macular Degeneration No family hx of        Review of Systems:    See Below    Physical Exam:  Vitals:    07/22/19 0812   BP: 121/68  "  Pulse: 50   Resp: 16   Weight: 78 kg (172 lb)   Height: 1.575 m (5' 2\")     Exam:  Constitutional: healthy, alert and no distress  Head: normocephalic. Atraumatic.   Eyes: no redness or jaundice noted   ENT: oropharnx normal.  MMM.  Neck supple.    Cardiovascular: RRR no m/g/r   Respiratory: clear   Gastrointestinal: soft, non-tender, normoactive bowel sounds   : deferred  Skin: no suspicious lesions or rashes  Psychiatric: affect normal/bright and answering questions inappropriately (confused)    Musculoskeletal exam:  Gait/Station/Posture: non-antalgic  Cervical spine: no TTP, mildly limited ROM     Thoracic spine:  Normal     Myofascial tenderness:  no      Neurologic exam:  CN:  Cranial nerves 2-12 are normal appears grossly normal  Motor:  Unable to assess  Reflexes:     Biceps:     R:  2/4 L: 2/4   Brachioradialis   R:  2/4 L: 2/4   Triceps:  R:  2/4 L: 2/4   Patella:  R:  2/4 L: 2/4   Achilles:  R:  2/4 L: 2/4  Other reflexes:  Toes downgoing   Sensory:  Unable to assess    Diagnostic tests:  MRI of neck was completed on 9/2015 showing:  See chart for results    Personally reviewed imaging on 7/22/2019    Other testing (labs, diagnostics) reviewed:  Labs  Last Comprehensive Metabolic Panel:  Sodium   Date Value Ref Range Status   12/05/2018 141 133 - 144 mmol/L Final     Potassium   Date Value Ref Range Status   12/05/2018 4.5 3.4 - 5.3 mmol/L Final     Chloride   Date Value Ref Range Status   12/05/2018 106 94 - 109 mmol/L Final     Carbon Dioxide   Date Value Ref Range Status   12/05/2018 29 20 - 32 mmol/L Final     Anion Gap   Date Value Ref Range Status   12/05/2018 6 3 - 14 mmol/L Final     Glucose   Date Value Ref Range Status   12/05/2018 93 70 - 99 mg/dL Final     Comment:     Non Fasting     Urea Nitrogen   Date Value Ref Range Status   12/05/2018 29 7 - 30 mg/dL Final     Creatinine   Date Value Ref Range Status   12/05/2018 0.92 0.52 - 1.04 mg/dL Final     GFR Estimate   Date Value Ref Range " Status   12/05/2018 59 (L) >60 mL/min/1.7m2 Final     Comment:     Non  GFR Calc     Calcium   Date Value Ref Range Status   12/05/2018 8.9 8.5 - 10.1 mg/dL Final        EKG: NSR 1/2016    [place] Prescription Monitoring Program reviewed - no concerns    Outside records reviewed      Screening tools:  DIRE Score for ongoing opioid management: Not indicated      Assessment:  1. Cervical stenosis  2. Progressive dementia    Polly Gonzalez is a 78 year old female who presents for evaluation and management of her chronic neck pain. Due to the progressive state of her dementia, she is not a current candidate for many of the medication options. Furthermore, her significant other states that she would not tolerate renewed imaging (I.e. MRI). Although it is difficult to determine the severity of her pain symptoms due to her cognitive status, her significant other does state that this is not a daily issue. There are some days that she does not require any pain medication (ibuprofen). She also would not tolerate any interventional procedures at this time, especially since prior cervical RFAs only provided 1.5 months of relief. At this point, the goal is to manage her symptoms with physical therapy and medications. However, most medications have some added cognitive side effects, and therefore would not be appropriate in her state. We discussed continuing to take ibuprofen prn and not to exceed appropriate daily limit. We also discussed taking tylenol, and not to exceed 3g per day.  We discussed the feasibility of pool therapy and her significant other stated that he believes she would be able to participate and engage in aquatic PT.     Plan:  Diagnosis reviewed, treatment option addressed, and risk/benefits discussed.  Self-care instructions given.  I am recommending a multidisciplinary treatment plan to help this patient better manage her pain.      1. Physical Therapy: Referral for aquatic therapy  2. Pain  Psychologist to address issues of relaxation, behavioral change, coping style, and other factors important to improvement: n/a  3. Diagnostic Studies: not indicated  4. Medication Management:   1. Continue PRN Ibuprofen  2. Can take Acetaminophen PRN ( no more than 3g per day)  5. Further procedures recommended: none  6. Recommendations/follow-up for PCP:  no  7. Release of information: no  8. Follow up: as needed      Answers for HPI/ROS submitted by the patient on 7/22/2019   DANIEL 7 TOTAL SCORE: 6  General Symptoms: Yes  Skin Symptoms: Yes  HENT Symptoms: No  EYE SYMPTOMS: No  HEART SYMPTOMS: No  LUNG SYMPTOMS: Yes  INTESTINAL SYMPTOMS: No  URINARY SYMPTOMS: No  GYNECOLOGIC SYMPTOMS: No  BREAST SYMPTOMS: No  SKELETAL SYMPTOMS: Yes  BLOOD SYMPTOMS: No  NERVOUS SYSTEM SYMPTOMS: Yes  MENTAL HEALTH SYMPTOMS: Yes  Fever: No  Loss of appetite: No  Weight loss: No  Weight gain: No  Fatigue: Yes  Night sweats: Yes  Chills: Yes  Increased stress: Yes  Excessive thirst: No  Feeling hot or cold when others believe the temperature is normal: Yes  Loss of height: No  Post-operative complications: No  Surgical site pain: No  Hallucinations: No  Change in or Loss of Energy: Yes  Hyperactivity: No  Changes in hair: No  Changes in moles/birth marks: No  Itching: Yes  Rashes: No  Changes in nails: No  Acne: No  Hair in places you don't want it: No  Change in facial hair: No  Cough: Yes  Sputum or phlegm: No  Coughing up blood: No  Difficulty breating or shortness of breath: Yes  Snoring: No  Wheezing: Yes  Difficulty breathing on exertion: Yes  Nighttime Cough: No  Difficulty breathing when lying flat: Yes  Back pain: No  Muscle aches: No  Neck pain: Yes  Swollen joints: No  Joint pain: No  Bone pain: No  Muscle cramps: Yes  Muscle weakness: Yes  Joint stiffness: No  Bone fracture: No  Trouble with coordination: Yes  Dizziness or trouble with balance: Yes  Fainting or black-out spells: No  Memory loss: Yes  Headache: Yes  Seizures:  No  Speech problems: Yes  Tingling: Yes  Nervous or Anxious: Yes  Depression: Yes  Trouble sleeping: No  Trouble thinking or concentrating: Yes  Mood changes: Yes  Panic attacks: No

## 2019-07-22 NOTE — LETTER
7/22/2019       RE: Polly Gonzalez  [id number] [facility] [id number]     Dear Colleague,    Thank you for referring your patient, Polly Gonzalez, to the [facility] at [facility]. Please see a copy of my visit note below.                          [facility] Consultation    Date of visit: 7/22/2019    Reason for consultation:    Polly Gonzalez is a 78 year old female who is seen in consultation today at the request of her provider, Dr Ayala.    Primary Care Provider is Ghazal Lee.  Pain medications are being prescribed by her PCP.    Please see the [facility] health questionnaire which the patient completed and reviewed with me in detail.    Chief Complaint:    Chief Complaint   Patient presents with     Pain Management     New consult        Pain history:  Polly Gonzalez is a 78 year old female with medical history significant for progressive dementia as well as cervical stenosis. Due to her progressive dementia, the history was provided by her significant other.  He states that she has had neck and arm/hand pain for approximately 4 to 5 years. He attributes it to a fall that she experienced approximately 5 years ago.  The pain is difficult to describe, but he notes that when she moves her head she winces in pain. He states that sometimes he just notices her making grimaces or other facial expressions that he translates as neck pain. He states that she used to be able to describe her pain significantly better and that the pain used to radiate into both of her hands, especially in the thumb region. However, he states that that has improved and the pain no longer radiates into her hands. She has also had an EMG (approximately 3 years ago) that did not demonstrate radiculopathy.      Her current pain management regimen includes taking PRN ibuprofen. She takes up to 800mg of Ibuprofen at a time  several two to three times per day, but not every day. She has done physical therapy in the past, but not recently. Her significant other believes she would be able to engage in PT again if this was recommended.     Pain rating: intensity unable to assess due to cognitive status  Aggravating factors include: movements, weather, otherwise unable to assess due to cognitive status  Relieving factors include: ibuprofen  Any bowel or bladder incontinence: n/a    Current treatments include:  Ibuprofen 800mg BID, up to 3 times per week (not daily)    Previous medication treatments included:  Lidocaine 5% ointment    Other treatments have included:  Polly Gonzalez has not been seen at a pain clinic in the past.    PT: yes  Acupuncture: no  TENs Unit: no  Injections: yes, 4 years ago underwent cervical RFA    Past Medical History:  Past Medical History:   Diagnosis Date     Asthma      GERD (gastroesophageal reflux disease)      HX: breast cancer      Hyperlipidemia LDL goal < 130      Hypothyroidism      Mild dementia      Mild major depression (H)      Patient Active Problem List    Diagnosis Date Noted     Asthma 10/11/2017     Priority: Medium     GERD (gastroesophageal reflux disease) 10/11/2017     Priority: Medium     High cholesterol 10/11/2017     Priority: Medium     Multiple allergies 10/11/2017     Priority: Medium     Acute asthma 12/03/2016     Priority: Medium     History of colonic polyps 06/09/2016     Priority: Medium     4 mm tubular adenoma 12/2012 [facility]. 5 year return if has low risk from procedure at that time (12/2017) and very good life expectancy.       Memory loss 06/30/2015     Priority: Medium     Total body pain 06/30/2015     Priority: Medium     Mild persistent asthma 08/19/2014     Priority: Medium     Lumbago 07/03/2013     Priority: Medium     Hypothyroidism 06/18/2013     Priority: Medium     Fatigue 03/07/2013     Priority: Medium     Mild major depression (H) 03/07/2013     Priority: Medium        Past Surgical History:  Past Surgical History:   Procedure Laterality Date     BLEPHAROPLASTY  09     BUNIONECTOMY DANICA BILATERAL  1993     face life  09     HERNIA REPAIR, INCISIONAL  1998    ?   LLQ     MASTECTOMY, FLAP TRAM PEDICLE, COMBINED  1992     ROTATOR CUFF REPAIR RT/LT  1990/2001     Medications:  Current Outpatient Medications   Medication Sig Dispense Refill     albuterol (2.5 MG/3ML) 0.083% nebulizer solution Inhale 2.5 mg into the lungs       albuterol (PROAIR HFA/PROVENTIL HFA/VENTOLIN HFA) 108 (90 Base) MCG/ACT inhaler Inhale 2 puffs into the lungs every 6 hours as needed for shortness of breath / dyspnea or wheezing 1 Inhaler 1     cholecalciferol (VITAMIN D3) 5000 units TABS tablet Take by mouth daily       donepezil (ARICEPT) 10 MG tablet Take 1 tablet (10 mg) by mouth daily 90 tablet 3     ibuprofen (ADVIL/MOTRIN) 200 MG tablet Take 200 mg by mouth every 4 hours as needed for mild pain       levothyroxine (SYNTHROID/LEVOTHROID) 100 MCG tablet TAKE ONE TABLET BY MOUTH ONCE DAILY 90 tablet 3     lidocaine (XYLOCAINE) 2 % jelly Apply topically as needed for moderate pain       minocycline (MINOCIN/DYNACIN) 50 MG capsule Take 1 capsule (50 mg) by mouth 2 times daily 60 capsule 2     Multiple Vitamins-Minerals (MULTIVITAMIN ADULT PO)        psyllium (METAMUCIL/KONSYL) packet Take 1 packet by mouth daily       mupirocin (BACTROBAN) 2 % external ointment Apply topically 3 times daily (Patient not taking: Reported on 6/6/2019) 22 g 1     Allergies:     Allergies   Allergen Reactions     Animal Dander      CATS     Codeine Unknown     Dust Mites      DUST     Morphine Nausea and Vomiting     Other [Seasonal Allergies]      UNKNOWN     Prozac [Fluoxetine]      Shakes; couldn't stand up     Social History:  Home situation: lives with significant other  Occupation/Schooling: Former supervisor  Tobacco use: no  Alcohol use: no  Drug use: no  History of chemical dependency treatment: no    Family  "history:  Family History   Problem Relation Age of Onset     Cancer - colorectal Sister 60     Cancer Brother         kidney cancer     Glaucoma No family hx of      Macular Degeneration No family hx of        Review of Systems:    See Below    Physical Exam:  Vitals:    07/22/19 0812   BP: 121/68   Pulse: 50   Resp: 16   Weight: 78 kg (172 lb)   Height: 1.575 m (5' 2\")     Exam:  Constitutional: healthy, alert and no distress  Head: normocephalic. Atraumatic.   Eyes: no redness or jaundice noted   ENT: oropharnx normal.  MMM.  Neck supple.    Cardiovascular: RRR no m/g/r   Respiratory: clear   Gastrointestinal: soft, non-tender, normoactive bowel sounds   : deferred  Skin: no suspicious lesions or rashes  Psychiatric: affect normal/bright and answering questions inappropriately (confused)    Musculoskeletal exam:  Gait/Station/Posture: non-antalgic  Cervical spine: no TTP, mildly limited ROM     Thoracic spine:  Normal     Myofascial tenderness:  no      Neurologic exam:  CN:  Cranial nerves 2-12 are normal appears grossly normal  Motor:  Unable to assess  Reflexes:     Biceps:     R:  2/4 L: 2/4   Brachioradialis   R:  2/4 L: 2/4   Triceps:  R:  2/4 L: 2/4   Patella:  R:  2/4 L: 2/4   Achilles:  R:  2/4 L: 2/4  Other reflexes:  Toes downgoing   Sensory:  Unable to assess    Diagnostic tests:  MRI of neck was completed on 9/2015 showing:  See chart for results    Personally reviewed imaging on 7/22/2019    Other testing (labs, diagnostics) reviewed:  Labs  Last Comprehensive Metabolic Panel:  Sodium   Date Value Ref Range Status   12/05/2018 141 133 - 144 mmol/L Final     Potassium   Date Value Ref Range Status   12/05/2018 4.5 3.4 - 5.3 mmol/L Final     Chloride   Date Value Ref Range Status   12/05/2018 106 94 - 109 mmol/L Final     Carbon Dioxide   Date Value Ref Range Status   12/05/2018 29 20 - 32 mmol/L Final     Anion Gap   Date Value Ref Range Status   12/05/2018 6 3 - 14 mmol/L Final     Glucose   Date " Value Ref Range Status   12/05/2018 93 70 - 99 mg/dL Final     Comment:     Non Fasting     Urea Nitrogen   Date Value Ref Range Status   12/05/2018 29 7 - 30 mg/dL Final     Creatinine   Date Value Ref Range Status   12/05/2018 0.92 0.52 - 1.04 mg/dL Final     GFR Estimate   Date Value Ref Range Status   12/05/2018 59 (L) >60 mL/min/1.7m2 Final     Comment:     Non  GFR Calc     Calcium   Date Value Ref Range Status   12/05/2018 8.9 8.5 - 10.1 mg/dL Final        EKG: NSR 1/2016    [place] Prescription Monitoring Program reviewed - no concerns    Outside records reviewed      Screening tools:  DIRE Score for ongoing opioid management: Not indicated      Assessment:  1. Cervical stenosis  2. Progressive dementia    Polly Gonzalez is a 78 year old female who presents for evaluation and management of her chronic neck pain. Due to the progressive state of her dementia, she is not a current candidate for many of the medication options. Furthermore, her significant other states that she would not tolerate renewed imaging (I.e. MRI). Although it is difficult to determine the severity of her pain symptoms due to her cognitive status, her significant other does state that this is not a daily issue. There are some days that she does not require any pain medication (ibuprofen). She also would not tolerate any interventional procedures at this time, especially since prior cervical RFAs only provided 1.5 months of relief. At this point, the goal is to manage her symptoms with physical therapy and medications. However, most medications have some added cognitive side effects, and therefore would not be appropriate in her state. We discussed continuing to take ibuprofen prn and not to exceed appropriate daily limit. We also discussed taking tylenol, and not to exceed 3g per day.  We discussed the feasibility of pool therapy and her significant other stated that he believes she would be able to participate and engage in  aquatic PT.     Plan:  Diagnosis reviewed, treatment option addressed, and risk/benefits discussed.  Self-care instructions given.  I am recommending a multidisciplinary treatment plan to help this patient better manage her pain.      1. Physical Therapy: Referral for aquatic therapy  2. Pain Psychologist to address issues of relaxation, behavioral change, coping style, and other factors important to improvement: n/a  3. Diagnostic Studies: not indicated  4. Medication Management:   1. Continue PRN Ibuprofen  2. Can take Acetaminophen PRN ( no more than 3g per day)  5. Further procedures recommended: none  6. Recommendations/follow-up for PCP:  no  7. Release of information: no  8. Follow up: as needed    Again, thank you for allowing me to participate in the care of your patient.      Sincerely,    Felisa England MD

## 2019-07-23 ASSESSMENT — ANXIETY QUESTIONNAIRES: GAD7 TOTAL SCORE: 6

## 2019-09-30 ENCOUNTER — HEALTH MAINTENANCE LETTER (OUTPATIENT)
Age: 79
End: 2019-09-30

## 2019-10-08 ENCOUNTER — OFFICE VISIT (OUTPATIENT)
Dept: DERMATOLOGY | Facility: CLINIC | Age: 79
End: 2019-10-08
Payer: MEDICARE

## 2019-10-08 DIAGNOSIS — S00.81XA EXCORIATION OF FACE, INITIAL ENCOUNTER: Primary | ICD-10-CM

## 2019-10-08 ASSESSMENT — PAIN SCALES - GENERAL: PAINLEVEL: NO PAIN (0)

## 2019-10-08 NOTE — LETTER
10/8/2019       RE: Polly Gonzalez  [id number] [facility] [address]     Dear Colleague,    Thank you for referring your patient, Polly Gonzalez, to the [facility] DERMATOLOGY at [facility]. Please see a copy of my visit note below.    Formerly [facility] Dermatology Note      Dermatology Problem List:  1. Excoriations: ?background inflamatory process (rosacea vs seb derm vs folliculitis)- improved with below treatment              -  s/p: minocycline 50 mg BID; NAC w/ up-titration to target                3000 mg daily in divided dose   - duoderm to persistent lesion on chin  2.Seborrheic keratoses: benign nature discussed             Encounter Date: Oct 8, 2019    CC:   Chief Complaint   Patient presents with     Derm Problem     Polly is here today for follow up of Neurotic Excoriation. Polly has one spot on her chin she is still picking.          History of Present Illness:  Ms. Polly Gonzalez is a 79 year old female who presents as a follow-up for neurotic excoriations on the face. The patient was last seen 7/9/19 when minocycline 50 mg bid for 3 months and NAC were prescribed. Patient presents to clinic with her caregive and POA today.Patient is overall doing well today. Caregiver states that her skin has significantly improved on minocycline, swithced from bid to qday minocycline at beginning of September. Has persistent lesion on chin, face otherwise clear today. Patient continues to pick at lesion on chin, and caregiver states hair in this area is frequently shaved.  No longer taking NAC, as patient stated she did not feel well on it. No new lesions elsewhere.     Past Medical History:   Patient Active Problem List   Diagnosis     Fatigue     Mild major depression (H)     Hypothyroidism     Lumbago     Mild persistent asthma     Memory loss     Total body pain     History of colonic polyps     Acute asthma     Asthma     GERD (gastroesophageal reflux disease)      High cholesterol     Multiple allergies     Past Medical History:   Diagnosis Date     Asthma      GERD (gastroesophageal reflux disease)      HX: breast cancer      Hyperlipidemia LDL goal < 130      Hypothyroidism      Mild dementia (H)      Mild major depression (H)      Past Surgical History:   Procedure Laterality Date     BLEPHAROPLASTY  09     BUNIONECTOMY DANICA BILATERAL  1993     face life  09     HERNIA REPAIR, INCISIONAL  1998    ?   LLQ     MASTECTOMY, FLAP TRAM PEDICLE, COMBINED  1992     ROTATOR CUFF REPAIR RT/LT  1990/2001       Social History:  Patient reports that she has never smoked. She has never used smokeless tobacco. She reports current alcohol use. She reports that she does not use drugs.    Family History:  Family History   Problem Relation Age of Onset     Cancer - colorectal Sister 60     Cancer Brother         kidney cancer     Glaucoma No family hx of      Macular Degeneration No family hx of        Medications:  Current Outpatient Medications   Medication Sig Dispense Refill     albuterol (2.5 MG/3ML) 0.083% nebulizer solution Inhale 2.5 mg into the lungs       albuterol (PROAIR HFA/PROVENTIL HFA/VENTOLIN HFA) 108 (90 Base) MCG/ACT inhaler Inhale 2 puffs into the lungs every 6 hours as needed for shortness of breath / dyspnea or wheezing 1 Inhaler 1     cholecalciferol (VITAMIN D3) 5000 units TABS tablet Take by mouth daily       donepezil (ARICEPT) 10 MG tablet Take 1 tablet (10 mg) by mouth daily 90 tablet 3     ibuprofen (ADVIL/MOTRIN) 200 MG tablet Take 200 mg by mouth every 4 hours as needed for mild pain       levothyroxine (SYNTHROID/LEVOTHROID) 100 MCG tablet TAKE ONE TABLET BY MOUTH ONCE DAILY 90 tablet 3     lidocaine (XYLOCAINE) 2 % jelly Apply topically as needed for moderate pain       minocycline (MINOCIN/DYNACIN) 50 MG capsule Take 1 capsule (50 mg) by mouth 2 times daily 60 capsule 2     Multiple Vitamins-Minerals (MULTIVITAMIN ADULT PO)        mupirocin (BACTROBAN) 2 %  external ointment Apply topically 3 times daily 22 g 1     psyllium (METAMUCIL/KONSYL) packet Take 1 packet by mouth daily          Allergies   Allergen Reactions     Animal Dander      CATS     Codeine Unknown     Dust Mites      DUST     Morphine Nausea and Vomiting     Other [Seasonal Allergies]      UNKNOWN     Prozac [Fluoxetine]      Shakes; couldn't stand up         Review of Systems:  -Skin Establ Pt: The patient denies any new rash, pruritus, or lesions that are symptomatic, changing or bleeding, except as per HPI.  -Constitutional: Otherwise feeling well today, in usual state of health.  -HEENT: Patient denies nonhealing oral sores.  -Skin: As above in HPI. No additional skin concerns.    Physical exam:  Vitals: There were no vitals taken for this visit.  GEN: This is a well developed, well-nourished female in no acute distress, in a pleasant mood.    SKIN: Focused examination of the face was performed.  - Landis skin type: II  - Thin pink papule with overlying excoriation on the submental region  - No other lesions of concern on areas examined.     Impression/Plan:  1. Excoriations, concern for potential background rosacea, folliculitis or seborrheic dermatitis    Significantly improved to virtually cleared on minocycline 50mg bid, was briefly taking NAC but stopped due to side effects. Okay to stop minocycline and NAC at this time. One persistent lesion on chin today that patient continues to pick. Discussed with caregiver, that brief cessation of hair shaving in that area may reduce irritation.     Stop minocycline and NAC today    Apply duoderm to persistent lesion on chin    Instructed patient and caregiver to return to clinic if patient flares with cessation of minocycline and/or if there is concern that lesion on chin is infection. Reviewed signs of infection      CC Von Agarwal MD  [address] [place] [address] on close of this encounter.    Follow-up prn if patient flares  with cessation of minocycline and/or if there is concern that lesion on chin is infection.    Dr. Agarwal staffed the patient.    Staff Involved:  Resident(Deja Brock PGY2)/Staff    Staff Physician Comments:   I saw and evaluated the patient with the resident and I agree with the assessment and plan.  I was present for the examination. I have made edits if needed.    Von Agarwal MD  Staff Dermatologist and Dermatopathologist  , Department of Dermatology

## 2019-10-08 NOTE — NURSING NOTE
Chief Complaint   Patient presents with     Derm Problem     Polly is here today for follow up of Neurotic Excoriation. Polly has one spot on her chin she is still picking.      Shani Kaufman LPN

## 2019-10-08 NOTE — PROGRESS NOTES
[facility] Dermatology Note      Dermatology Problem List:  1. Excoriations: ?background inflamatory process (rosacea vs seb derm vs folliculitis)- improved with below treatment              - s/p: minocycline 50 mg BID; NAC w/ up-titration to target                3000 mg daily in divided dose   - duoderm to persistent lesion on chin  2.Seborrheic keratoses: benign nature discussed             Encounter Date: Oct 8, 2019    CC:   Chief Complaint   Patient presents with     Derm Problem     Polly is here today for follow up of Neurotic Excoriation. Polly has one spot on her chin she is still picking.          History of Present Illness:  Ms. Polly Gonzalez is a 79 year old female who presents as a follow-up for neurotic excoriations on the face. The patient was last seen 7/9/19 when minocycline 50 mg bid for 3 months and NAC were prescribed. Patient presents to clinic with her caregive and POA today.Patient is overall doing well today. Caregiver states that her skin has significantly improved on minocycline, swithced from bid to qday minocycline at beginning of September. Has persistent lesion on chin, face otherwise clear today. Patient continues to pick at lesion on chin, and caregiver states hair in this area is frequently shaved.  No longer taking NAC, as patient stated she did not feel well on it. No new lesions elsewhere.     Past Medical History:   Patient Active Problem List   Diagnosis     Fatigue     Mild major depression (H)     Hypothyroidism     Lumbago     Mild persistent asthma     Memory loss     Total body pain     History of colonic polyps     Acute asthma     Asthma     GERD (gastroesophageal reflux disease)     High cholesterol     Multiple allergies     Past Medical History:   Diagnosis Date     Asthma      GERD (gastroesophageal reflux disease)      HX: breast cancer      Hyperlipidemia LDL goal < 130      Hypothyroidism      Mild dementia (H)      Mild major depression (H)       Past Surgical History:   Procedure Laterality Date     BLEPHAROPLASTY  09     BUNIONECTOMY DANICA BILATERAL  1993     face life  09     HERNIA REPAIR, INCISIONAL  1998    ?   LLQ     MASTECTOMY, FLAP TRAM PEDICLE, COMBINED  1992     ROTATOR CUFF REPAIR RT/LT  1990/2001       Social History:  Patient reports that she has never smoked. She has never used smokeless tobacco. She reports current alcohol use. She reports that she does not use drugs.    Family History:  Family History   Problem Relation Age of Onset     Cancer - colorectal Sister 60     Cancer Brother         kidney cancer     Glaucoma No family hx of      Macular Degeneration No family hx of        Medications:  Current Outpatient Medications   Medication Sig Dispense Refill     albuterol (2.5 MG/3ML) 0.083% nebulizer solution Inhale 2.5 mg into the lungs       albuterol (PROAIR HFA/PROVENTIL HFA/VENTOLIN HFA) 108 (90 Base) MCG/ACT inhaler Inhale 2 puffs into the lungs every 6 hours as needed for shortness of breath / dyspnea or wheezing 1 Inhaler 1     cholecalciferol (VITAMIN D3) 5000 units TABS tablet Take by mouth daily       donepezil (ARICEPT) 10 MG tablet Take 1 tablet (10 mg) by mouth daily 90 tablet 3     ibuprofen (ADVIL/MOTRIN) 200 MG tablet Take 200 mg by mouth every 4 hours as needed for mild pain       levothyroxine (SYNTHROID/LEVOTHROID) 100 MCG tablet TAKE ONE TABLET BY MOUTH ONCE DAILY 90 tablet 3     lidocaine (XYLOCAINE) 2 % jelly Apply topically as needed for moderate pain       minocycline (MINOCIN/DYNACIN) 50 MG capsule Take 1 capsule (50 mg) by mouth 2 times daily 60 capsule 2     Multiple Vitamins-Minerals (MULTIVITAMIN ADULT PO)        mupirocin (BACTROBAN) 2 % external ointment Apply topically 3 times daily 22 g 1     psyllium (METAMUCIL/KONSYL) packet Take 1 packet by mouth daily          Allergies   Allergen Reactions     Animal Dander      CATS     Codeine Unknown     Dust Mites      DUST     Morphine Nausea and Vomiting      Other [Seasonal Allergies]      UNKNOWN     Prozac [Fluoxetine]      Shakes; couldn't stand up         Review of Systems:  -Skin Establ Pt: The patient denies any new rash, pruritus, or lesions that are symptomatic, changing or bleeding, except as per HPI.  -Constitutional: Otherwise feeling well today, in usual state of health.  -HEENT: Patient denies nonhealing oral sores.  -Skin: As above in HPI. No additional skin concerns.    Physical exam:  Vitals: There were no vitals taken for this visit.  GEN: This is a well developed, well-nourished female in no acute distress, in a pleasant mood.    SKIN: Focused examination of the face was performed.  - Landis skin type: II  - Thin pink papule with overlying excoriation on the submental region  - No other lesions of concern on areas examined.     Impression/Plan:  1. Excoriations, concern for potential background rosacea, folliculitis or seborrheic dermatitis    Significantly improved to virtually cleared on minocycline 50mg bid, was briefly taking NAC but stopped due to side effects. Okay to stop minocycline and NAC at this time. One persistent lesion on chin today that patient continues to pick. Discussed with caregiver, that brief cessation of hair shaving in that area may reduce irritation.     Stop minocycline and NAC today    Apply duoderm to persistent lesion on chin    Instructed patient and caregiver to return to clinic if patient flares with cessation of minocycline and/or if there is concern that lesion on chin is infection. Reviewed signs of infection      CC Von Agarwal MD  [address] [place] [address] on close of this encounter.    Follow-up prn if patient flares with cessation of minocycline and/or if there is concern that lesion on chin is infection.    Dr. Agarwal staffed the patient.    Staff Involved:  Resident(Deja Brock PGY2)/Staff    Staff Physician Comments:   I saw and evaluated the patient with the resident and I  agree with the assessment and plan.  I was present for the examination. I have made edits if needed.    Von Agarwal MD  Staff Dermatologist and Dermatopathologist  , Department of Dermatology

## 2019-11-21 ENCOUNTER — ANCILLARY PROCEDURE (OUTPATIENT)
Dept: MAMMOGRAPHY | Facility: CLINIC | Age: 79
End: 2019-11-21
Attending: FAMILY MEDICINE
Payer: MEDICARE

## 2019-11-21 DIAGNOSIS — Z12.31 VISIT FOR SCREENING MAMMOGRAM: ICD-10-CM

## 2019-11-21 PROCEDURE — 77067 SCR MAMMO BI INCL CAD: CPT

## 2019-12-04 NOTE — RESULT ENCOUNTER NOTE
Dear Polly,   Here are your recent mammogram  results which are negative (normal). Please continue with your current plan of care.       Grady Oliva MD

## 2019-12-06 ENCOUNTER — OFFICE VISIT (OUTPATIENT)
Dept: FAMILY MEDICINE | Facility: CLINIC | Age: 79
End: 2019-12-06
Attending: FAMILY MEDICINE
Payer: MEDICARE

## 2019-12-06 VITALS
DIASTOLIC BLOOD PRESSURE: 73 MMHG | WEIGHT: 177 LBS | HEIGHT: 62 IN | BODY MASS INDEX: 32.57 KG/M2 | SYSTOLIC BLOOD PRESSURE: 124 MMHG | HEART RATE: 73 BPM

## 2019-12-06 DIAGNOSIS — J45.30 MILD PERSISTENT ASTHMA, UNCOMPLICATED: Primary | ICD-10-CM

## 2019-12-06 DIAGNOSIS — H90.6 MIXED CONDUCTIVE AND SENSORINEURAL HEARING LOSS OF BOTH EARS: ICD-10-CM

## 2019-12-06 DIAGNOSIS — F03.90 DEMENTIA WITHOUT BEHAVIORAL DISTURBANCE, UNSPECIFIED DEMENTIA TYPE: ICD-10-CM

## 2019-12-06 DIAGNOSIS — E03.8 OTHER SPECIFIED HYPOTHYROIDISM: ICD-10-CM

## 2019-12-06 PROCEDURE — 25000128 H RX IP 250 OP 636: Mod: ZF

## 2019-12-06 PROCEDURE — 90662 IIV NO PRSV INCREASED AG IM: CPT | Mod: ZF

## 2019-12-06 PROCEDURE — G0008 ADMIN INFLUENZA VIRUS VAC: HCPCS | Mod: ZF

## 2019-12-06 PROCEDURE — G0463 HOSPITAL OUTPT CLINIC VISIT: HCPCS | Mod: ZF,25

## 2019-12-06 ASSESSMENT — MIFFLIN-ST. JEOR: SCORE: 1226.12

## 2019-12-06 NOTE — LETTER
12/6/2019       RE: Polly Gonzalez  [id number]  [facility] [address]     Dear Colleague,    Thank you for referring your patient, Polly Gonzalez, to the [facility] at [facility]. Please see a copy of my visit note below.    Pt. Here for CPE with partner Az    1. Dementia, not Alzheimer's.   ---Polly is functionally non-verbal for medical issues, Az gives history  --working [employer]  --Living in single level home with Az in [facility]  --Polly wants to stay at home as long as possible    [facility] agency gives help 2 hours/day--not dementia trained care. [facility]: Elder Benefit speicialist: Anay Claymstead: [phone].       Polly will sometimes not want other women around and will refuse care  Az assists with bathing, thyroid medication, intermittnent intense neck pain,   On Ariciept but not thinking helping; getting from old neurologist  They will decide with neurology team on whether wish to continue  Polly able to toilet self.    Az is health care power of     Az does all shopping, clearning cooking, appoitnments. Etc. Feels able to keep up.  Living together 7 years  No advance care directives or living will, no personal choice documents      2 Concern about hearing--refer to audiology  3. Thyroid--on levothyroxine 100 mcg   due for TSH, free T4  4. Preventive care  Hx breast cancer, last mammogram 11/2019; discussed with Az--will not repeat   No repeat colon screen--last colonsocpy 2017, normal  Lipids reasonably normal 2018  BMP normal  2018--no need to repeat    Due flu vaccine, had both pnumonia vaccines  Shingrix due     4. Falls--Last fall, was in spring, stumbled on item  Now has shower stool  Hand rails  Az keeps with a lot of wakling, holds arms as needed  Eliminated loose rugs      Past Medical History:   Diagnosis Date     Asthma      GERD (gastroesophageal reflux disease)      HX: breast cancer      Hyperlipidemia LDL  goal < 130      Hypothyroidism      Mild dementia (H)      Mild major depression (H)      Social History     Socioeconomic History     Marital status: Single     Spouse name: Not on file     Number of children: Not on file     Years of education: Not on file     Highest education level: Not on file   Occupational History     Not on file   Social Needs     Financial resource strain: Not on file     Food insecurity:     Worry: Not on file     Inability: Not on file     Transportation needs:     Medical: Not on file     Non-medical: Not on file   Tobacco Use     Smoking status: Never Smoker     Smokeless tobacco: Never Used   Substance and Sexual Activity     Alcohol use: Yes     Alcohol/week: 0.0 standard drinks     Comment: rare     Drug use: No     Sexual activity: Yes     Partners: Male   Lifestyle     Physical activity:     Days per week: Not on file     Minutes per session: Not on file     Stress: Not on file   Relationships     Social connections:     Talks on phone: Not on file     Gets together: Not on file     Attends Jewish service: Not on file     Active member of club or organization: Not on file     Attends meetings of clubs or organizations: Not on file     Relationship status: Not on file     Intimate partner violence:     Fear of current or ex partner: Not on file     Emotionally abused: Not on file     Physically abused: Not on file     Forced sexual activity: Not on file   Other Topics Concern     Parent/sibling w/ CABG, MI or angioplasty before 65F 55M? Not Asked   Social History Narrative     Not on file     Sold home in [place] and purchased a home [place] of [place] [[place] acres and a house [single floor]]. Angelika went through and took off the roof so now in repair. Plan to move in November 15, 2017.  Staying at different places [cheap hotel in [place], [place]].Eat variety of food, fruit veggies, +dairy, some  Little meat, fish, chicken,     D3 supplement in addition to  "MVI      ROS  Dyspnea with 1 flght, need to stop 1/2 way  No longer using advair  Using proventil-- maybe 1/3 weeks    Blood pressure 124/73, pulse 73, height 1.575 m (5' 2\"), weight 80.3 kg (177 lb), not currently breastfeeding.    Exam   EXAM:  Constitutional: healthy, alert, no distress; Mainly nonverbal during visit, can use single words or sometimtes    Unbable to follow most commmands  Cardiovascular: negative, PMI normal. No lifts, heaves, or thrills. RRR. No murmurs, clicks gallops or rub  Head: Normocephalic. No masses, lesions, tenderness or abnormalities  Neck: Neck supple. No adenopathy. Thyroid symmetric, normal size,, Carotids without bruits.  ENT: ENT exam normal, no neck nodes or sinus tenderness  Abdomen: Abdomen soft, non-tender. BS normal. No masses, organomegaly  LYMPH: Normal cervical lymph nodes  No edema  Able to get up from chair unaided, but needs assist to climb to table, gait wide based      A/P  1.Preventive Care  Discussed with Az regarding health care goals and values concerning preventive care choices.  No further colon cancer screening, breast cancer screening  Flu vaccine today, Shingrix to do at pharmacy as none in clinic  Defer futher bone density screening      2. Advanced care planning  Discussed Using [employer] website, importance of detailed advanced directives  And having them on file with all providers    3. Fatigue/limit to exertion  Trial of advair to see if can improve exertion limit w/out dyspnea    4. Possible hearing loss--refer audiology    5. Moderate dementia  Safe at home with dedicated caregiver and support services  Goal is to advance supports to keep at home as long as possible  To preserve quality of life over quantity of life in terms of medication, testing and invasiveness/side effects of interventions     Follow-up 6 months    Grady Oliva MD      "

## 2019-12-06 NOTE — PROGRESS NOTES
Pt. Here for CPE with partner Az    1. Dementia, not Alzheimer's.   ---Polly is functionally non-verbal for medical issues, Az gives history  --working [facility] [employer]  --Living in single level home with Az in [facility]  --Polly wants to stay at home as long as possible    [facility] agency gives help 2 hours/day--not dementia trained care. [facility] [phone].       Polly will sometimes not want other women around and will refuse care  Az assists with bathing, thyroid medication, intermittnent intense neck pain,   On Ariciept but not thinking helping; getting from old neurologist  They will decide with neurology team on whether wish to continue  Polly able to toilet self.    Az is health care power of     Az does all shopping, clearning cooking, appoitnments. Etc. Feels able to keep up.  Living together 7 years  No advance care directives or living will, no personal choice documents      2 Concern about hearing--refer to audiology  3. Thyroid--on levothyroxine 100 mcg   due for TSH, free T4  4. Preventive care  Hx breast cancer, last mammogram 11/2019; discussed with Az--will not repeat   No repeat colon screen--last colonsocpy 2017, normal  Lipids reasonably normal 2018  BMP normal  2018--no need to repeat    Due flu vaccine, had both pnumonia vaccines  Shingrix due     4. Falls--Last fall, was in spring, stumbled on item  Now has shower stool  Hand rails  Az keeps with a lot of wakling, holds arms as needed  Eliminated loose rugs      Past Medical History:   Diagnosis Date     Asthma      GERD (gastroesophageal reflux disease)      HX: breast cancer      Hyperlipidemia LDL goal < 130      Hypothyroidism      Mild dementia (H)      Mild major depression (H)      Social History     Socioeconomic History     Marital status: Single     Spouse name: Not on file     Number of children: Not on file     Years of education: Not on file     Highest education level:  "Not on file   Occupational History     Not on file   Social Needs     Financial resource strain: Not on file     Food insecurity:     Worry: Not on file     Inability: Not on file     Transportation needs:     Medical: Not on file     Non-medical: Not on file   Tobacco Use     Smoking status: Never Smoker     Smokeless tobacco: Never Used   Substance and Sexual Activity     Alcohol use: Yes     Alcohol/week: 0.0 standard drinks     Comment: rare     Drug use: No     Sexual activity: Yes     Partners: Male   Lifestyle     Physical activity:     Days per week: Not on file     Minutes per session: Not on file     Stress: Not on file   Relationships     Social connections:     Talks on phone: Not on file     Gets together: Not on file     Attends Gnosticism service: Not on file     Active member of club or organization: Not on file     Attends meetings of clubs or organizations: Not on file     Relationship status: Not on file     Intimate partner violence:     Fear of current or ex partner: Not on file     Emotionally abused: Not on file     Physically abused: Not on file     Forced sexual activity: Not on file   Other Topics Concern     Parent/sibling w/ CABG, MI or angioplasty before 65F 55M? Not Asked   Social History Narrative     Not on file     Sold home in [place] and purchased a home [place] of [place] [[place] acres and a house [single floor]]. Angelika went through and took off the roof so now in repair. Plan to move in November 15, 2017.  Staying at different places [cheap hotel in [place], [place]].Eat variety of food, fruit veggies, +dairy, some  Little meat, fish, chicken,     D3 supplement in addition to MVI      ROS  Dyspnea with 1 flght, need to stop 1/2 way  No longer using advair  Using proventil-- maybe 1/3 weeks    Blood pressure 124/73, pulse 73, height 1.575 m (5' 2\"), weight 80.3 kg (177 lb), not currently breastfeeding.    Exam   EXAM:  Constitutional: healthy, alert, no distress; Mainly nonverbal " during visit, can use single words or sometimtes    Unbable to follow most commmands  Cardiovascular: negative, PMI normal. No lifts, heaves, or thrills. RRR. No murmurs, clicks gallops or rub  Head: Normocephalic. No masses, lesions, tenderness or abnormalities  Neck: Neck supple. No adenopathy. Thyroid symmetric, normal size,, Carotids without bruits.  ENT: ENT exam normal, no neck nodes or sinus tenderness  Abdomen: Abdomen soft, non-tender. BS normal. No masses, organomegaly  LYMPH: Normal cervical lymph nodes  No edema  Able to get up from chair unaided, but needs assist to climb to table, gait wide based      A/P  1.Preventive Care  Discussed with Az regarding health care goals and values concerning preventive care choices.  No further colon cancer screening, breast cancer screening  Flu vaccine today, Shingrix to do at pharmacy as none in clinic  Defer futher bone density screening      2. Advanced care planning  Discussed Using [employer] website, importance of detailed advanced directives  And having them on file with all providers    3. Fatigue/limit to exertion  Trial of advair to see if can improve exertion limit w/out dyspnea    4. Possible hearing loss--refer audiology    5. Moderate dementia  Safe at home with dedicated caregiver and support services  Goal is to advance supports to keep at home as long as possible  To preserve quality of life over quantity of life in terms of medication, testing and invasiveness/side effects of interventions     Follow-up 6 months

## 2019-12-06 NOTE — NURSING NOTE
Clinic Administered Medication Documentation    MEDICATION LIST:   Injectable Medication Documentation    Patient was given High dose Influenza Vaccine. Prior to medication administration, verified patients identity using patient s name and date of birth. Please see MAR and medication order for additional information. Patient instructed to report any adverse reaction to staff immediately .      Was entire vial of medication used? Yes  Vial/Syringe: Syringe  Expiration Date:  6/26/2020  Was this medication supplied by the patient? No

## 2019-12-20 PROBLEM — F03.90 DEMENTIA WITHOUT BEHAVIORAL DISTURBANCE (H): Status: ACTIVE | Noted: 2019-12-20

## 2020-02-05 ENCOUNTER — TELEPHONE (OUTPATIENT)
Dept: OBGYN | Facility: CLINIC | Age: 80
End: 2020-02-05

## 2020-02-07 NOTE — PROGRESS NOTES
[facility] Neurology Follow Up Visit    Polly Gonzalez MRN# [id number]   Age: 79 year old YOB: 1940     Brief history of symptoms: The patient was initially seen in neurologic consultation on 6/26/2019 for evaluation of progressive dementia and cervical stenosis with imbalance. Please see the comprehensive neurologic consultation note from that date in the Epic records for details.  The patient has Alzheimer's disease as well as cervical stenosis, and recommendations following the visit were to limit MRI or labs unless absolutely necessary.  The patient was offered donepezil, pain clinic referral, physical therapy for neck pain.  These recommendations were not done by the decision of the patient's caregiver, Shabbir Way, but she was to be seen over the next year again for reconsideration of these options.    Interval history: The patient sleeps after eating breakfast, lunch, and dinner.  18-20 hours per day.  She still goes to the bathroom on her own, but finding the bathroom is difficult.  She has about 1 event of aggressive behaviors a year, and these  relate to coming out of sleep.  She needs help in the shower, she stands and there are three railings. She still isn't cleaning or cooking, she has lost most of her ability to hobbies (worked with [employer]).  She has difficulty remembering her  and children. No falls this year.  She doesn't drive. No choking incidents, and she eats when food is placed in front of her. There is a report of pain in the front of her head with coughing, but this is generally subsided with ibuprofen (3 tabs).  She isn't enrolled in ADRC program anymore as it seemed to dissolve in [place] (no one in 3-4 months available).     The  does indicate he would like to have advanced care planning (final directives, POLST).  He understands about caregiver burnout, and would like some assistance. I indicated I could place a  consult in for him and he  was willing to drive back to the [employer] in order to do this.  He is her primary care-giver, they live on a single floor home in [place].      Past Medical History:     Past Medical History:   Diagnosis Date     Asthma      GERD (gastroesophageal reflux disease)      HX: breast cancer      Hyperlipidemia LDL goal < 130      Hypothyroidism      Mild dementia (H)      Mild major depression (H)       Past Surgical History:     Past Surgical History:   Procedure Laterality Date     BLEPHAROPLASTY  09     BUNIONECTOMY DANICA BILATERAL  1993     face life  09     HERNIA REPAIR, INCISIONAL  1998    ?   LLQ     MASTECTOMY, FLAP TRAM PEDICLE, COMBINED  1992     ROTATOR CUFF REPAIR RT/LT  1990/2001        Social History:     Tobacco Use     Smoking status: Never Smoker     Smokeless tobacco: Never Used   Substance Use Topics     Alcohol use: Yes     Alcohol/week: 0.0 standard drinks     Comment: rare     Drug use: No        Family History:     Family History   Problem Relation Age of Onset     Cancer - colorectal Sister 60     Cancer Brother         kidney cancer     Glaucoma No family hx of      Macular Degeneration No family hx of         Medications:     Current Outpatient Medications   Medication Sig     albuterol (2.5 MG/3ML) 0.083% nebulizer solution Inhale 2.5 mg into the lungs     albuterol (PROAIR HFA/PROVENTIL HFA/VENTOLIN HFA) 108 (90 Base) MCG/ACT inhaler Inhale 2 puffs into the lungs every 6 hours as needed for shortness of breath / dyspnea or wheezing     cholecalciferol (VITAMIN D3) 5000 units TABS tablet Take by mouth daily     donepezil (ARICEPT) 10 MG tablet Take 1 tablet (10 mg) by mouth daily     fluticasone-salmeterol (ADVAIR) 100-50 MCG/DOSE inhaler Inhale 1 puff into the lungs every 12 hours     ibuprofen (ADVIL/MOTRIN) 200 MG tablet Take 200 mg by mouth every 4 hours as needed for mild pain     levothyroxine (SYNTHROID/LEVOTHROID) 100 MCG tablet TAKE ONE TABLET BY MOUTH ONCE DAILY     lidocaine  (XYLOCAINE) 2 % jelly Apply topically as needed for moderate pain     minocycline (MINOCIN/DYNACIN) 50 MG capsule Take 1 capsule (50 mg) by mouth 2 times daily     Multiple Vitamins-Minerals (MULTIVITAMIN ADULT PO)      mupirocin (BACTROBAN) 2 % external ointment Apply topically 3 times daily     psyllium (METAMUCIL/KONSYL) packet Take 1 packet by mouth daily      Allergies:     Allergies   Allergen Reactions     Animal Dander      CATS     Codeine Unknown     Dust Mites      DUST     Morphine Nausea and Vomiting     Other [Seasonal Allergies]      UNKNOWN     Prozac [Fluoxetine]      Shakes; couldn't stand up        Review of Systems:   A comprehensive 10 point review of systems (constitutional, ENT, cardiac, peripheral vascular, lymphatic, respiratory, GI, , Musculoskeletal, skin, Neurological) was performed and found to be negative except as described in this note.      Physical Exam:   Vitals: /80 (BP Location: Left arm, Patient Position: Sitting)   Pulse 77   Wt 76.3 kg (168 lb 3.2 oz)   SpO2 97%   BMI 30.76 kg/m    General: Seated comfortably in no acute distress. Well dressed, make up well put on, has a nice hat on as well.  Makes eye contact with me but will be sleeping on her husbands shoulder.  HEENT: Neck supple with normal range of motion.   Heart: Regular rate  Lungs: breathing comfortably  GI: Non tender  Extremities: no edema  Skin: No rashes  Neurologic:     Mental Status: Alerts when stimulated, follows single step commands that are repeated, not oriented to place or time but does know that it is cold out.  Palmomental present.      Cranial Nerves: Visual fields intact to threat. No asymmetry of face. No dysarthria.      Motor: No tremors or other abnormal movements observed. Slowed movements throughout.     Deep Tendon Reflexes: No clonus.      Sensory: Intact to light touch     Coordination: Slowed movements, can touch finger to nose.     Gait: Sands slowly, uses partner for walking  balance. Wide base, short steps, stooped posture.         Assessment and Plan:   Assessment:  - Progressive Alzheimer's    The patient is well cared for, has had no major falls this year, and is continuing her weight.  Her  does report some need for having advanced care planning assistance and would like also to have some information about community resources and care-giver burnout. I will continue to follow the wishes of the patient's caregiver, shabbir Way, as to limit testing.  He indicates that her neck pain is well controlled at this time, and I indicated that he is welcome to have a PT consult or Pain clinic consult for Polly at any time should her pain affect her daily routine.  We discussed the diagnosis of Alzheimer's dementia and that it is progressive.       Plan:  - Social work consultation    Future:  - Rivastigmine patch if needed  - Pain clinic referral if needed  - PT consultation if needed    -----------------------------------------------------Addendum----------------------------------------------  I was under the impression that the patient's care-giver,  Shabbir Way, was her .  This is not the case, and I was mistaken.  The care giver I spoke with is her long-term friend.    -----------------------------------------------------------------------------------------------------------------      Follow up in Neurology clinic in one year or earlier as needed should new concerns arise.    ROGER Lizarraga D.O.   of Neurology    Greater than 50% of the total time (30 min) in this patient encounter was spent on counseling and/or coordination of care. We reviewed diagnostic results, impressions, and discussed other possible tests if symptoms do not improve. We discussed the implications of the diagnosis, as well as risks and benefits of management options. We reviewed treatment instructions and our scheduled follow-up as specified in the discharge plan. We also  discussed the importance of compliance with the chosen course of treatment. The patient is in agreement with this plan and has no further questions.

## 2020-02-10 ENCOUNTER — OFFICE VISIT (OUTPATIENT)
Dept: NEUROLOGY | Facility: CLINIC | Age: 80
End: 2020-02-10
Payer: MEDICARE

## 2020-02-10 VITALS
OXYGEN SATURATION: 97 % | BODY MASS INDEX: 30.76 KG/M2 | WEIGHT: 168.2 LBS | HEART RATE: 77 BPM | SYSTOLIC BLOOD PRESSURE: 121 MMHG | DIASTOLIC BLOOD PRESSURE: 80 MMHG

## 2020-02-10 DIAGNOSIS — G30.8 ALZHEIMER'S DISEASE OF OTHER ONSET WITHOUT BEHAVIORAL DISTURBANCE: Primary | ICD-10-CM

## 2020-02-10 DIAGNOSIS — F02.80 ALZHEIMER'S DISEASE OF OTHER ONSET WITHOUT BEHAVIORAL DISTURBANCE: Primary | ICD-10-CM

## 2020-02-10 ASSESSMENT — PAIN SCALES - GENERAL: PAINLEVEL: NO PAIN (0)

## 2020-02-10 NOTE — NURSING NOTE
Chief Complaint   Patient presents with     RECHECK     [facility] RETURN - 6 MO F/U        Sima Seo, EMT

## 2020-02-10 NOTE — LETTER
2/10/2020       RE: Polly Gonzalez  [id number] [facility] [address]     Dear Colleague,    Thank you for referring your patient, Polly Gonzalez, to the [facility] at [facility]. Please see a copy of my visit note below.    [facility] Neurology Follow Up Visit    Polly Gonzalez MRN# [id number]   Age: 79 year old YOB: 1940     Brief history of symptoms: The patient was initially seen in neurologic consultation on 6/26/2019 for evaluation of progressive dementia and cervical stenosis with imbalance. Please see the comprehensive neurologic consultation note from that date in the Epic records for details.  The patient has Alzheimer's disease as well as cervical stenosis, and recommendations following the visit were to limit MRI or labs unless absolutely necessary.  The patient was offered donepezil, pain clinic referral, physical therapy for neck pain.  These recommendations were not done by the decision of the patient's caregiver, Shabbir Way, but she was to be seen over the next year again for reconsideration of these options.    Interval history: The patient sleeps after eating breakfast, lunch, and dinner.  18-20 hours per day.  She still goes to the bathroom on her own, but finding the bathroom is difficult.  She has about 1 event of aggressive behaviors a year, and these  relate to coming out of sleep.  She needs help in the shower, she stands and there are three railings. She still isn't cleaning or cooking, she has lost most of her ability to hobbies (worked with [employer]).  She has difficulty remembering her  and children. No falls this year.  She doesn't drive. No choking incidents, and she eats when food is placed in front of her. There is a report of pain in the front of her head with coughing, but this is generally subsided with ibuprofen (3 tabs).  She isn't enrolled in ADRC program anymore as it seemed to dissolve in [place] (no one  in 3-4 months available).     The  does indicate he would like to have advanced care planning (final directives, POLST).  He understands about caregiver burnout, and would like some assistance. I indicated I could place a  consult in for him and he was willing to drive back to the [employer] in order to do this.  He is her primary care-giver, they live on a single floor home in [place].      Past Medical History:     Past Medical History:   Diagnosis Date     Asthma      GERD (gastroesophageal reflux disease)      HX: breast cancer      Hyperlipidemia LDL goal < 130      Hypothyroidism      Mild dementia (H)      Mild major depression (H)       Past Surgical History:     Past Surgical History:   Procedure Laterality Date     BLEPHAROPLASTY  09     BUNIONECTOMY DANICA BILATERAL  1993     face life  09     HERNIA REPAIR, INCISIONAL  1998    ?   LLQ     MASTECTOMY, FLAP TRAM PEDICLE, COMBINED  1992     ROTATOR CUFF REPAIR RT/LT  1990/2001        Social History:     Tobacco Use     Smoking status: Never Smoker     Smokeless tobacco: Never Used   Substance Use Topics     Alcohol use: Yes     Alcohol/week: 0.0 standard drinks     Comment: rare     Drug use: No        Family History:     Family History   Problem Relation Age of Onset     Cancer - colorectal Sister 60     Cancer Brother         kidney cancer     Glaucoma No family hx of      Macular Degeneration No family hx of         Medications:     Current Outpatient Medications   Medication Sig     albuterol (2.5 MG/3ML) 0.083% nebulizer solution Inhale 2.5 mg into the lungs     albuterol (PROAIR HFA/PROVENTIL HFA/VENTOLIN HFA) 108 (90 Base) MCG/ACT inhaler Inhale 2 puffs into the lungs every 6 hours as needed for shortness of breath / dyspnea or wheezing     cholecalciferol (VITAMIN D3) 5000 units TABS tablet Take by mouth daily     donepezil (ARICEPT) 10 MG tablet Take 1 tablet (10 mg) by mouth daily     fluticasone-salmeterol (ADVAIR) 100-50  MCG/DOSE inhaler Inhale 1 puff into the lungs every 12 hours     ibuprofen (ADVIL/MOTRIN) 200 MG tablet Take 200 mg by mouth every 4 hours as needed for mild pain     levothyroxine (SYNTHROID/LEVOTHROID) 100 MCG tablet TAKE ONE TABLET BY MOUTH ONCE DAILY     lidocaine (XYLOCAINE) 2 % jelly Apply topically as needed for moderate pain     minocycline (MINOCIN/DYNACIN) 50 MG capsule Take 1 capsule (50 mg) by mouth 2 times daily     Multiple Vitamins-Minerals (MULTIVITAMIN ADULT PO)      mupirocin (BACTROBAN) 2 % external ointment Apply topically 3 times daily     psyllium (METAMUCIL/KONSYL) packet Take 1 packet by mouth daily      Allergies:     Allergies   Allergen Reactions     Animal Dander      CATS     Codeine Unknown     Dust Mites      DUST     Morphine Nausea and Vomiting     Other [Seasonal Allergies]      UNKNOWN     Prozac [Fluoxetine]      Shakes; couldn't stand up        Review of Systems:   A comprehensive 10 point review of systems (constitutional, ENT, cardiac, peripheral vascular, lymphatic, respiratory, GI, , Musculoskeletal, skin, Neurological) was performed and found to be negative except as described in this note.      Physical Exam:   Vitals: /80 (BP Location: Left arm, Patient Position: Sitting)   Pulse 77   Wt 76.3 kg (168 lb 3.2 oz)   SpO2 97%   BMI 30.76 kg/m     General: Seated comfortably in no acute distress. Well dressed, make up well put on, has a nice hat on as well.  Makes eye contact with me but will be sleeping on her husbands shoulder.  HEENT: Neck supple with normal range of motion.   Heart: Regular rate  Lungs: breathing comfortably  GI: Non tender  Extremities: no edema  Skin: No rashes  Neurologic:     Mental Status: Alerts when stimulated, follows single step commands that are repeated, not oriented to place or time but does know that it is cold out.  Palmomental present.      Cranial Nerves: Visual fields intact to threat. No asymmetry of face. No dysarthria.       Motor: No tremors or other abnormal movements observed. Slowed movements throughout.     Deep Tendon Reflexes: No clonus.      Sensory: Intact to light touch     Coordination: Slowed movements, can touch finger to nose.     Gait: Sands slowly, uses partner for walking balance. Wide base, short steps, stooped posture.         Assessment and Plan:   Assessment:  - Progressive Alzheimer's    The patient is well cared for, has had no major falls this year, and is continuing her weight.  Her  does report some need for having advanced care planning assistance and would like also to have some information about community resources and care-giver burnout. I will continue to follow the wishes of the patient's caregiver, shabbir Way, as to limit testing.  He indicates that her neck pain is well controlled at this time, and I indicated that he is welcome to have a PT consult or Pain clinic consult for Polly at any time should her pain affect her daily routine.  We discussed the diagnosis of Alzheimer's dementia and that it is progressive.       Plan:  - Social work consultation    Future:  - Rivastigmine patch if needed  - Pain clinic referral if needed  - PT consultation if needed    -----------------------------------------------------Addendum----------------------------------------------  I was under the impression that the patient's care-giver,  Shabbir Way, was her .  This is not the case, and I was mistaken.  The care giver I spoke with is her long-term friend.    -----------------------------------------------------------------------------------------------------------------    Follow up in Neurology clinic in one year or earlier as needed should new concerns arise.    ROGER Lizarraga D.O.   of Neurology    Greater than 50% of the total time (30 min) in this patient encounter was spent on counseling and/or coordination of care. We reviewed diagnostic results, impressions, and  discussed other possible tests if symptoms do not improve. We discussed the implications of the diagnosis, as well as risks and benefits of management options. We reviewed treatment instructions and our scheduled follow-up as specified in the discharge plan. We also discussed the importance of compliance with the chosen course of treatment. The patient is in agreement with this plan and has no further questions.

## 2020-02-12 ENCOUNTER — TELEPHONE (OUTPATIENT)
Dept: FAMILY MEDICINE | Facility: CLINIC | Age: 80
End: 2020-02-12

## 2020-02-12 NOTE — TELEPHONE ENCOUNTER
Central Prior Authorization Team   Phone: [phone]      PA Initiation    Medication: fluticasone-salmeterol (ADVAIR) 100-50 MCG/DOSE inhaler  Insurance Company: [employer] - Phone [phone] Fax [phone]  Pharmacy Filling the Rx: [facility] - [place], [place] - [address] [place] [address]  Filling Pharmacy Phone: [phone]  Filling Pharmacy Fax:    Start Date: 2/12/2020

## 2020-02-12 NOTE — TELEPHONE ENCOUNTER
Prior Authorization Retail Medication Request    Medication/Dose: fluticasone-salmeterol (ADVAIR) 100-50 MCG/DOSE inhaler  ICD code (if different than what is on RX):    Previously Tried and Failed:    Rationale:      Insurance Name:  [employer]  Insurance ID:  [id number]      Pharmacy Information (if different than what is on RX)  Name:  [facility]  [place], [place] - [address]  Phone:  [phone]

## 2020-02-13 NOTE — TELEPHONE ENCOUNTER
Prior Authorization Approval    Authorization Effective Date: 2/12/2020  Authorization Expiration Date:    Medication: fluticasone-salmeterol (ADVAIR) 100-50 MCG/DOSE inhaler-APPROVED  Approved Dose/Quantity:   Reference #:     Insurance Company: [employer] - Phone [phone] Fax [phone]  Expected CoPay:       CoPay Card Available:      Foundation Assistance Needed:    Which Pharmacy is filling the prescription (Not needed for infusion/clinic administered): [facility] - [place], [place] - [address]   Pharmacy Notified: Yes-Pharmacy will notify patient when ready.  Patient Notified: No

## 2020-02-19 ENCOUNTER — PATIENT OUTREACH (OUTPATIENT)
Dept: CARE COORDINATION | Facility: CLINIC | Age: 80
End: 2020-02-19
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
## 2020-03-12 DIAGNOSIS — E03.8 OTHER SPECIFIED HYPOTHYROIDISM: ICD-10-CM

## 2020-03-14 RX ORDER — LEVOTHYROXINE SODIUM 100 UG/1
TABLET ORAL
Qty: 90 TABLET | Refills: 3 | Status: SHIPPED | OUTPATIENT
Start: 2020-03-14 | End: 2021-05-26

## 2020-03-14 NOTE — TELEPHONE ENCOUNTER
Received refill request for levothyroxine. Patient due for labs but had recent visit but did not get her labs done. Spoke with Az, patient's  he will have her get labs done a her local clinic. Nurse will fax orders on Monday 3/16.

## 2020-03-25 ENCOUNTER — TRANSFERRED RECORDS (OUTPATIENT)
Dept: HEALTH INFORMATION MANAGEMENT | Facility: CLINIC | Age: 80
End: 2020-03-25

## 2021-01-15 ENCOUNTER — HEALTH MAINTENANCE LETTER (OUTPATIENT)
Age: 81
End: 2021-01-15

## 2021-02-11 ENCOUNTER — DOCUMENTATION ONLY (OUTPATIENT)
Dept: OTHER | Facility: CLINIC | Age: 81
End: 2021-02-11

## 2021-02-19 ENCOUNTER — TELEPHONE (OUTPATIENT)
Dept: OBGYN | Facility: CLINIC | Age: 81
End: 2021-02-19

## 2021-02-19 NOTE — TELEPHONE ENCOUNTER
Az called asking for medical records to be sent out to [facility].  Az states that he has power of .  This writer did not see any PoA on file.  This writer requested that Az fax the PoA to our office.  No information has been sent out.  Az states that the pt is non-verbal.

## 2021-02-24 ENCOUNTER — DOCUMENTATION ONLY (OUTPATIENT)
Dept: OTHER | Facility: CLINIC | Age: 81
End: 2021-02-24

## 2021-03-30 ENCOUNTER — TELEPHONE (OUTPATIENT)
Dept: OBGYN | Facility: CLINIC | Age: 81
End: 2021-03-30

## 2021-03-30 NOTE — TELEPHONE ENCOUNTER
Spoke with Shabbir and gave him the phone number for records release ([phone]).  He will contact them for further assistance

## 2021-03-30 NOTE — TELEPHONE ENCOUNTER
----- Message from Roman Sharp sent at 3/29/2021 12:17 PM CDT -----  Regarding: Medical Records request  Contact: [phone]  03/29/2021 @ 12:17 PM    Shabbir Way, partner/power of , [phone], called requesting medical records for Pt.  Pt is a previous Pt of Ghazal Lee and Grady Oliva.  Shabbir (Az) states that they have been trying to retrieve medical records for 3 weeks now.  Medical Records phone number was provided to Shabbir, he said he would prefer to talk to member of clinical team to discuss a way to expedite process. Please call at .    Thank you!   Roman BUSTAMANTE     Please DO NOT send this message and / or reply back to sender. Call Center Representatives DO NOT respond to messages.

## 2021-04-28 ENCOUNTER — TELEPHONE (OUTPATIENT)
Dept: OBGYN | Facility: CLINIC | Age: 81
End: 2021-04-28

## 2021-04-28 NOTE — TELEPHONE ENCOUNTER
Fax received from [facility] for admission orders. Placed in Dr. Oliva's mailbox to complete.    Called and left VM with Desire that fax was received and will be faxed back once completed and to call if she needs anything else.

## 2021-05-03 ENCOUNTER — TELEPHONE (OUTPATIENT)
Dept: OBGYN | Facility: CLINIC | Age: 81
End: 2021-05-03

## 2021-05-03 NOTE — TELEPHONE ENCOUNTER
Call from  Radha Love in [facility] - [phone]  Received: 3 days ago  Message Contents   Silvina Bishop, RN  [facility]             Referral information     Called and spoke with Radha.  She understands that Dr Oliva does not want to do admit orders, as she only say patient once in 2019.  States that she is unsure how to proceed.      Reviewed previous meds ordered here, and advised that Polly could make appointment here with Dr Oliva, or, if preferred, with another provider elsewhere who could assist with orders.

## 2021-05-26 ENCOUNTER — OFFICE VISIT (OUTPATIENT)
Dept: FAMILY MEDICINE | Facility: CLINIC | Age: 81
End: 2021-05-26
Attending: FAMILY MEDICINE
Payer: MEDICARE

## 2021-05-26 VITALS
BODY MASS INDEX: 28.53 KG/M2 | HEART RATE: 58 BPM | WEIGHT: 161 LBS | SYSTOLIC BLOOD PRESSURE: 110 MMHG | DIASTOLIC BLOOD PRESSURE: 72 MMHG | HEIGHT: 63 IN

## 2021-05-26 DIAGNOSIS — F03.90 DEMENTIA WITHOUT BEHAVIORAL DISTURBANCE, UNSPECIFIED DEMENTIA TYPE: Primary | ICD-10-CM

## 2021-05-26 DIAGNOSIS — Z71.89 ADVANCE CARE PLANNING: ICD-10-CM

## 2021-05-26 PROCEDURE — 99213 OFFICE O/P EST LOW 20 MIN: CPT | Performed by: FAMILY MEDICINE

## 2021-05-26 PROCEDURE — G0463 HOSPITAL OUTPT CLINIC VISIT: HCPCS

## 2021-05-26 RX ORDER — LISINOPRIL 10 MG/1
10 TABLET ORAL DAILY
COMMUNITY

## 2021-05-26 ASSESSMENT — ANXIETY QUESTIONNAIRES
GAD7 TOTAL SCORE: 6
2. NOT BEING ABLE TO STOP OR CONTROL WORRYING: SEVERAL DAYS
7. FEELING AFRAID AS IF SOMETHING AWFUL MIGHT HAPPEN: NOT AT ALL
6. BECOMING EASILY ANNOYED OR IRRITABLE: SEVERAL DAYS
3. WORRYING TOO MUCH ABOUT DIFFERENT THINGS: SEVERAL DAYS
1. FEELING NERVOUS, ANXIOUS, OR ON EDGE: SEVERAL DAYS
5. BEING SO RESTLESS THAT IT IS HARD TO SIT STILL: SEVERAL DAYS

## 2021-05-26 ASSESSMENT — MIFFLIN-ST. JEOR: SCORE: 1164.42

## 2021-05-26 ASSESSMENT — PAIN SCALES - GENERAL: PAINLEVEL: NO PAIN (0)

## 2021-05-26 ASSESSMENT — PATIENT HEALTH QUESTIONNAIRE - PHQ9
SUM OF ALL RESPONSES TO PHQ QUESTIONS 1-9: 21
5. POOR APPETITE OR OVEREATING: SEVERAL DAYS

## 2021-05-26 NOTE — LETTER
"5/26/2021       RE: Polly Gonzalez  [id number] [facility] [id number]     Dear Colleague,    Thank you for referring your patient, Polly Gonzalez, to the [facility] [place] [facility]. Please see a copy of my visit note below.    Pt. Here for medical records dicussion with partner Az--last seen 12/2019     1. Dementia, not Alzheimer's.   -Polly is functionally non-verbal for medical issues, Az gives history  --Dementia has advanced since last visit  --Living in single level home with Az in [facility], using [employer] agency gives help max 4 hours/day--not dementia trained care and no longer sufficient to manage Polly's needes ( ADR: Elder Benefit speicialist[facility] [phone].)  --Planning now for long term care and will need to transfer medical care to [place] physician-- in [facility]?  He is hoping to get al of Polly's [facility] records from all providers all at once without having to request from each individually.      Of note, Az does not have  power of  for  health care, states \"no one does\".No advance care directives or living will, no personal choice documents in place.  .  A/P  1.Dementia  2. Advanced care planning  Counseled Az to speak with clinic manager regarding process for having records transferred, as well as patient relations as a place to start process. Reviewed chart and identified  Polly's utilized clinics with Az as places to request records from.  Az to have  at new clinic in [place] call or send ELEANOR to transfer medical records to new  doctor  May need to request from every [facility] clinic sepearately    Counseled on health care system decision making in presence and absence of health care power of , recommend Az consult with  regarding formalizing  his legal status to make health care decisions for Polly    Follow-up prn "     Again, thank you for allowing me to participate in the care of your patient.      Sincerely,    Grady Oliva MD

## 2021-05-26 NOTE — PROGRESS NOTES
"Pt. Here for medical records dicussion with partner Az--last seen 12/2019     1. Dementia, not Alzheimer's.   -Polly is functionally non-verbal for medical issues, Az gives history  --Dementia has advanced since last visit  --Living in single level home with Az in [facility], using [employer] agency gives help max 4 hours/day--not dementia trained care and no longer sufficient to manage Polly's needes ( [employer]DRC: Elder Benefit speicialist: [facility]: [phone].)  --Planning now for long term care and will need to [facility] medical [facility] [place] [facility]  He is hoping to get al of Polly's [facility] records from all providers all at once without having to request from each individually.      Of note, Az does not have  power of  for  health care, states \"no one does\".No advance care directives or living will, no personal choice documents in place.  .  A/P  1.Dementia  2. Advanced care planning  Counseled Az to speak with clinic manager regarding process for having records transferred, as well as patient relations as a place to start process. Reviewed chart and identified  Polly's utilized clinics with Az as places to request records from.  Az to have  at new clinic in [place] call or send ELEANOR to transfer medical records to new  doctor  May need to request from every [facility] clinic sepearately    Counseled on health care system decision making in presence and absence of health care power of , recommend Az consult with  regarding formalizing  his legal status to make health care decisions for Polly    Follow-up prn   "

## 2021-05-27 ASSESSMENT — ANXIETY QUESTIONNAIRES: GAD7 TOTAL SCORE: 6

## 2021-10-24 ENCOUNTER — HEALTH MAINTENANCE LETTER (OUTPATIENT)
Age: 81
End: 2021-10-24

## 2022-02-13 ENCOUNTER — HEALTH MAINTENANCE LETTER (OUTPATIENT)
Age: 82
End: 2022-02-13

## 2022-10-16 ENCOUNTER — HEALTH MAINTENANCE LETTER (OUTPATIENT)
Age: 82
End: 2022-10-16

## 2023-03-26 ENCOUNTER — HEALTH MAINTENANCE LETTER (OUTPATIENT)
Age: 83
End: 2023-03-26

## 2024-06-01 ENCOUNTER — HEALTH MAINTENANCE LETTER (OUTPATIENT)
Age: 84
End: 2024-06-01